# Patient Record
Sex: MALE | Race: WHITE | NOT HISPANIC OR LATINO | URBAN - METROPOLITAN AREA
[De-identification: names, ages, dates, MRNs, and addresses within clinical notes are randomized per-mention and may not be internally consistent; named-entity substitution may affect disease eponyms.]

---

## 2020-03-16 ENCOUNTER — VIRTUAL VISIT (OUTPATIENT)
Dept: FAMILY MEDICINE | Facility: OTHER | Age: 50
End: 2020-03-16

## 2020-03-17 NOTE — PROGRESS NOTES
"Date: 2020 15:48:59  Clinician: Andreea Lindsey  Clinician NPI: [id number]  Patient: Kun Ordaz  Patient : 1970  Patient Address: [address] [place] [id number]  Patient Phone: [phone]  Visit Protocol: URI  Patient Summary:  Kun is a 50 year old ( : 1970 ) male who initiated a Visit for COVID-19 (Coronavirus) evaluation and screening. When asked the question \"Please sign me up to receive news, health information and promotions. \", Kun responded \"Yes\".    Kun states his symptoms started gradually 3-6 days ago. After his symptoms started, they improved and then got worse again.   His symptoms consist of a headache and enlarged lymph nodes.   Symptom details   Headache: He states the headache is mild (1-3 on a 10 point pain scale).    Kun denies having ear pain, malaise, rhinitis, facial pain or pressure, myalgias, wheezing, sore throat, cough, nasal congestion, teeth pain, fever, and chills. He also denies having recent facial or sinus surgery in the past 60 days, taking antibiotic medication for the symptoms, and having a sinus infection within the past year. He is not experiencing dyspnea.    Pertinent COVID-19 (Coronavirus) information  Kun has not traveled internationally or to the areas where COVID-19 (Coronavirus) is widespread in the last 14 days before the start of his symptoms.   Kun has not had close contact with a suspected or laboratory-confirmed COVID-19 patient within 14 days of symptom onset.   Kun is not a healthcare worker and does not work in a healthcare facility.   Pertinent medical history  Kun does not need a return to work/school note.   Weight: 210 lbs   Kun does not smoke or use smokeless tobacco.   Weight: 210 lbs    MEDICATIONS: No current medications, ALLERGIES: NKDA  Clinician Response:  Dear Kun,  Based on the information provided, you have a viral upper respiratory infection, otherwise known as a cold. " Symptoms vary from person to person, but can include sneezing, coughing, a runny nose, sore throat, and headache and range from mild to severe.  Unfortunately, there are no medications that can cure a cold, so treatment is focused on controlling symptoms as much as possible. Most people gradually feel better until symptoms are gone in 1-2 weeks.  Medication information  Because you have a viral infection, antibiotics will not help you get better. Treating a viral infection with antibiotics could actually make you feel worse.  For more information on why I am not prescribing antibiotics, please watch this video: Antibiotics Aren't Always the Answer.  Unless you are allergic to the over-the-counter medication(s) below, I recommend using:       Acetaminophen (Tylenol or store brand) oral tablet. Take 1-2 tablets by mouth every 4-6 hours to help with the discomfort.      Ibuprofen (Advil or store brand) 200 mg oral tablet. Take 1-3 tablets (200-600 mg) by mouth every 8 hours to help with the discomfort. Make sure to take the ibuprofen with food. Do not exceed 2400 mg in 24 hours.     Over-the-counter medications do not require a prescription. Ask the pharmacist if you have any questions.  Self care  The following tips will keep you as comfortable as possible while you recover:     Rest    Drink plenty of water and other liquids     When to seek care  Please be seen in a clinic or urgent care if new symptoms develop, or symptoms become worse.  COVID-19 (Coronavirus) General Information  With the increase in the number of COVID-19 (Coronavirus) cases, we understand you may have some questions. Below is some helpful information on COVID-19 (Coronavirus).  How can I protect myself and others from the COVID-19 (Coronavirus)?  Because there is currently no vaccine to prevent infection, the best way to protect yourself is to avoid being exposed to this virus. Put distance between yourself and other people if COVID-19  (Coronavirus) is spreading in your community. The virus is thought to spread mainly from person-to-person.     Between people who are in close contact with one another (within about 6 about) for prolonged period (10 minutes or longer).    Through respiratory droplets produced when an infected person coughs or sneezes.     The CDC recommends the following additional steps to protect yourself and others:     Wash your hands often with soap and water for at least 20 seconds, especially after blowing your nose, coughing, or sneezing; going to the bathroom; and before eating or preparing food.  Use an alcohol-based hand  that contains at least 60 percent alcohol if soap and water are not available.        Avoid touching your eyes, nose and mouth with unwashed hands.    Avoid close contact with people who are sick.    Stay home when you are sick.    Cover your cough or sneeze with a tissue, then throw the tissue in the trash.    Clean and disinfect frequently touched objects and surfaces.     You can help stop COVID-19 (Coronavirus) by knowing the signs and symptoms:     Fever    Cough    Shortness of breath     Contact your healthcare provider if   Develop symptoms   AND   Have been in close contact with a person known to have COVID-19 (Coronavirus) or live in or have recently traveled from an area with ongoing spread of COVID-19 (Coronavirus). Call ahead before you go to a doctor's office or emergency room. Tell them about your recent travel and your symptoms.   For the most up to date information, visit the CDC's website.  Steps to help prevent the spread of COVID-19 (Coronavirus) if you are sick  If you are sick with COVID-19 (Coronavirus) or suspect you are infected with the virus that causes COVID-19 (Coronavirus), follow the steps below to help prevent the disease from spreading&nbsp;to people in your home and community.     Stay home except to get medical care. Home isolation may be started in  "consultation with your healthcare clinician.    Separate yourself from other people and animals in your home.    Call ahead before visiting your doctor if you have a medical appointment.    Wear a facemask when you are around other people.    Cover your cough and sneezes.    Clean your hands often.    Avoid sharing personal household items.    Clean and disinfect frequently touched objects and surfaces everyday.    You will need to have someone drop off medications or household supplies (if needed) at your house without coming inside or in contact with you or others living in your house.    Monitor your symptoms and seek prompt medical care if your illness is worsening (e.g. Difficulty breathing).    Discontinue home isolation only in consultation with your healthcare provider.     For more detailed and up to date information on what to do if you are sick, visit this link: What to Do If You Are Sick With Coronavirus Disease 2019 (COVID-19).  Do I need to be tested for COVID-19 (Coronavirus)?     At this time, the limited number of tests available are controlled by the state and local health departments and are being reserved for more seriously ill patients, those with known exposure to confirmed patients, and those with recent travel (within 14 days) to countries with high rates of COVID-19 (Coronavirus).    Decisions on which patients receive testing will be based on the local spread of COVID-19 (Coronavirus) as well as the symptoms. Your healthcare provider will make the final decision on whether you should be tested.    In the meantime, if you have concerns that you may have been exposed, it is reasonable to practice \"social distancing.\"&nbsp; If you are ill with a cold or flu-like illness, please monitor your symptoms and reach out to your healthcare provider if your symptoms worsen.    For more up to date information, visit this link: COVID-19 (Coronavirus) Frequently Asked Questions and Answers.    "   Diagnosis: Viral URI  Diagnosis ICD: J06.9

## 2020-08-14 NOTE — PROGRESS NOTES
"  SUBJECTIVE:   Kun is a 50 year old male who presents to clinic today to establish care and for annual wellness exam.    # Depression  - restarted therapy for the first time in a while 1.5 months ago  - likes the new therapist  - has been a lifelong issue  - SI has started to come up, mixed in \"overall dark, depressed thoughts\", denies specific plans  - last suicide attempt in college   - has been on medications in the past, in 90's for about 6 years, tried various antidepressants, lots of weight gain, \"violent suicidal thoughts\"    - drinks an average of 5-7 beers over the course of the week, hard liquor rarely  - former smoker  - occasionally uses oral THC gummy for sleep which helps    - takes diphenhydramine product (like Advil PM) about 3 days a week  - melatonin doesn't help  - CBD oral supplement sometimes helps    PHQ 12/3/2015 2016 2020   PHQ-9 Total Score 11 17 16   Q9: Thoughts of better off dead/self-harm past 2 weeks Several days More than half the days Several days       # Skin tags  - on chest  - not painful or pruritic  - no enlarging    # Health Maintenance  - HIV Screenin/24/15 nonreactive  - Hep C Screenin/24/15 nonreactive  - BP:   BP Readings from Last 3 Encounters:   20 135/83   16 125/78   12/03/15 128/84   - Cholesterol:   Recent Labs   Lab Test 12/03/15  0848   CHOL 292.0*   HDL 62.0   .0*   TRIG 252.0*   - Diabetes Screenin/25/15 5.8%  - Colonoscopy: start at age 50  - (+) seatbelt use, (+) helmet    - Exercise: walks 2.5 miles daily with his dogs but usually just 1/2 mile at a time 25 minutes at a time.   - at least one serving of fruits or vegetables each day, not every meal    - was down to 190lbs last Fall, felt \"great\" at this weight  - regained weight during pandemic    Today's PHQ-2 Score:   PHQ-2 (  [employer]) 2020   Q1: Little interest or pleasure in doing things 2 2   Q2: Feeling down, depressed or hopeless 2 2   PHQ-2 " Score 4 4       Review of Systems:   Constitutional - no fevers, chills, night sweats, unintentional weight loss/gain   Eyes - no vision concerns   Ears/Nose/Throat - no hearing concerns, no dysphagia/odynophagia   Cardiovascular - no chest pain, palpitations   Pulmonary - no shortness of breath, wheezing, coughing   GI - no abdominal pain, constipation, diarrhea, nausea, vomiting    - no dysuria, polyuria, hematuria   Musculoskeletal - no joint or muscle pain  Integument - as above   Neuro - no weakness, numbness, paresthesia   Heme - no easy bruising/bleeding   Endocrine - no polyuria, weight loss/gain, dry skin, excessive sweating, hair loss   Psychiatric -  As above   Allergic/Immunologic - no history of anaphylaxis, no history of recurrent infections    Past Medical History:   Diagnosis Date     Depressive disorder, not elsewhere classified     quit treatment in      OCD (obsessive compulsive disorder)      Suicide attempt (H)      Past Surgical History:   Procedure Laterality Date     NO HISTORY OF SURGERY       Family History   Problem Relation Age of Onset     Hypertension Father      C.A.D. Father         MI age 42     Hyperlipidemia Father      Hyperlipidemia Mother      Cancer Maternal Grandmother         pancreatic     Cancer Maternal Grandfather         ?     Circulatory Paternal Aunt         bilateral bka      Depression Sister      Social History     Tobacco Use     Smoking status: Former Smoker     Last attempt to quit: 2003     Years since quittin.7     Smokeless tobacco: Never Used     Tobacco comment: casual, social smoker.. 1 pack every 6 months.. quit smoking 03   Substance Use Topics     Alcohol use: Yes     Frequency: 4 or more times a week     Drinks per session: 1 or 2     Binge frequency: Less than monthly     Comment: 3 drinks per week     Drug use: No     Social History     Social History Narrative    No illicit IV or intranasal drug use    No blood transfusions     "No tattoos or piercings        Lives with wife and roommate.  No pets.  Works as a  does a lot of travel.  Feels safe in all environments.  Has a good support network.    Елена Jenkins PA-C    12/03/2015       Current Outpatient Medications   Medication     melatonin 1 MG/ML LIQD liquid     No current facility-administered medications for this visit.      I have reviewed the patient's past medical, surgical, family, and social history.     OBJECTIVE:   /83 (BP Location: Right arm, Patient Position: Sitting, Cuff Size: Adult Regular)   Pulse 78   Temp 98.1  F (36.7  C) (Skin)   Resp 15   Ht 1.72 m (5' 7.72\")   Wt 97.9 kg (215 lb 12 oz)   SpO2 96%   BMI 33.08 kg/m      Constitutional: well-appearing, appears stated age  Eyes: conjunctivae without erythema, sclera anicteric. Pupils equal, round, and reactive to light.   ENT: oropharynx clear, TM grey bilateral  Cardiac: regular rate and rhythm, normal S1/S2, no murmur/rubs/gallops  Respiratory: lungs clear to auscultation bilaterally, normal work of breathing, no wheezes/crackles  GI: abdomen soft, non-tender, non-distended  Extremities: warm and well perfused, radial pulses 2+ and equal, cap refill brisk.  Lymph: no cervical or supraclavicular lymphadenopathy  Skin: approximately 1cm tan flat stuck on rough raised plaque left lower back. Multiple subcentimeter red to purple papules scattered across back, one on center of chest, one on left upper arm.   Psych: affect is full and appropriate, speech is fluent and non-pressured    ASSESSMENT AND PLAN:     COUNSELING:   Reviewed preventive health counseling, as reflected in patient instructions       Regular exercise       Healthy diet/nutrition    (Z00.00) Annual physical exam  (primary encounter diagnosis)  Comment: Age appropriate screening and preventive services provided. Up to date on immunizations. BP good.     Life Goals:  Limit red meat (including pork) to once a week on average  Buy a " "pedometer  Think about real exercise meaning taking 15-20 minutes to walk a mile    Plan: Hemoglobin A1c ([place]), Lipid Profile,       (Z91.89) At risk for heart disease  Comment: Due to extensive family history of premature ASCVD, Kun was interested in coronary artery calcification scoring, which I think is reasonable.  Plan: CT Coronary Calcium Scan         (D18.01) Cherry hemangioma  Comment: \"Skin tags\" in question appear consistent with cherry hemangiomas, which he has multiple of, as well as SK's. Did not conduct full skin exam but has multiple pigmented flat macules that appear consistent in appearance across his back, appear to be benign nevi.   Plan:     (F33.1) Major depressive disorder, recurrent episode, moderate (H)  Comment: Has restarted therapy. No active plans of self harm. Offered to discuss medication options for depression which he declined.   Plan:     Hoang Bryan MD  [facility]  08/17/2020, 3:04 PM    "

## 2020-08-17 ENCOUNTER — OFFICE VISIT (OUTPATIENT)
Dept: FAMILY MEDICINE | Facility: CLINIC | Age: 50
End: 2020-08-17
Payer: COMMERCIAL

## 2020-08-17 VITALS
DIASTOLIC BLOOD PRESSURE: 83 MMHG | HEIGHT: 68 IN | TEMPERATURE: 98.1 F | WEIGHT: 215.75 LBS | OXYGEN SATURATION: 96 % | HEART RATE: 78 BPM | RESPIRATION RATE: 15 BRPM | SYSTOLIC BLOOD PRESSURE: 135 MMHG | BODY MASS INDEX: 32.7 KG/M2

## 2020-08-17 DIAGNOSIS — D18.01 CHERRY HEMANGIOMA: ICD-10-CM

## 2020-08-17 DIAGNOSIS — Z00.00 ANNUAL PHYSICAL EXAM: Primary | ICD-10-CM

## 2020-08-17 DIAGNOSIS — Z91.89 AT RISK FOR HEART DISEASE: ICD-10-CM

## 2020-08-17 DIAGNOSIS — F33.1 MAJOR DEPRESSIVE DISORDER, RECURRENT EPISODE, MODERATE (H): Chronic | ICD-10-CM

## 2020-08-17 SDOH — HEALTH STABILITY: MENTAL HEALTH: HOW MANY STANDARD DRINKS CONTAINING ALCOHOL DO YOU HAVE ON A TYPICAL DAY?: 1 OR 2

## 2020-08-17 SDOH — HEALTH STABILITY: MENTAL HEALTH: HOW OFTEN DO YOU HAVE A DRINK CONTAINING ALCOHOL?: 4 OR MORE TIMES A WEEK

## 2020-08-17 SDOH — HEALTH STABILITY: MENTAL HEALTH: HOW OFTEN DO YOU HAVE 6 OR MORE DRINKS ON ONE OCCASION?: NEVER

## 2020-08-17 ASSESSMENT — ANXIETY QUESTIONNAIRES
IF YOU CHECKED OFF ANY PROBLEMS ON THIS QUESTIONNAIRE, HOW DIFFICULT HAVE THESE PROBLEMS MADE IT FOR YOU TO DO YOUR WORK, TAKE CARE OF THINGS AT HOME, OR GET ALONG WITH OTHER PEOPLE: VERY DIFFICULT
3. WORRYING TOO MUCH ABOUT DIFFERENT THINGS: MORE THAN HALF THE DAYS
2. NOT BEING ABLE TO STOP OR CONTROL WORRYING: MORE THAN HALF THE DAYS
1. FEELING NERVOUS, ANXIOUS, OR ON EDGE: MORE THAN HALF THE DAYS
7. FEELING AFRAID AS IF SOMETHING AWFUL MIGHT HAPPEN: NEARLY EVERY DAY
5. BEING SO RESTLESS THAT IT IS HARD TO SIT STILL: NOT AT ALL
GAD7 TOTAL SCORE: 14
6. BECOMING EASILY ANNOYED OR IRRITABLE: NEARLY EVERY DAY

## 2020-08-17 ASSESSMENT — PATIENT HEALTH QUESTIONNAIRE - PHQ9
SUM OF ALL RESPONSES TO PHQ QUESTIONS 1-9: 16
5. POOR APPETITE OR OVEREATING: MORE THAN HALF THE DAYS

## 2020-08-17 ASSESSMENT — MIFFLIN-ST. JEOR: SCORE: 1818.65

## 2020-08-17 NOTE — PATIENT INSTRUCTIONS
Life Goals:  Limit red meat (including pork) to once a week on average  Buy a pedometer  Think about real exercise meaning taking 15-20 minutes to walk a mile    To schedule your CT Coronary artery calcification score, please call:  [facility]  [phone]

## 2020-08-18 DIAGNOSIS — Z00.00 ANNUAL PHYSICAL EXAM: ICD-10-CM

## 2020-08-18 LAB
CHOLEST SERPL-MCNC: 243 MG/DL
HBA1C MFR BLD: 5.4 % (ref 4.1–5.7)
HDLC SERPL-MCNC: 49 MG/DL
LDLC SERPL CALC-MCNC: 162 MG/DL
NONHDLC SERPL-MCNC: 194 MG/DL
TRIGL SERPL-MCNC: 164 MG/DL

## 2020-08-18 ASSESSMENT — ANXIETY QUESTIONNAIRES: GAD7 TOTAL SCORE: 14

## 2020-08-28 ENCOUNTER — HOSPITAL ENCOUNTER (OUTPATIENT)
Dept: CT IMAGING | Facility: CLINIC | Age: 50
Discharge: HOME | End: 2020-08-28
Attending: FAMILY MEDICINE | Admitting: FAMILY MEDICINE
Payer: COMMERCIAL

## 2020-08-28 DIAGNOSIS — Z91.89 AT RISK FOR HEART DISEASE: ICD-10-CM

## 2020-08-28 PROCEDURE — 75571 CT HRT W/O DYE W/CA TEST: CPT

## 2020-08-28 PROCEDURE — 75571 CT HRT W/O DYE W/CA TEST: CPT | Mod: 26 | Performed by: STUDENT IN AN ORGANIZED HEALTH CARE EDUCATION/TRAINING PROGRAM

## 2020-08-31 DIAGNOSIS — Z91.89 AT RISK FOR HEART DISEASE: Primary | ICD-10-CM

## 2020-08-31 RX ORDER — SIMVASTATIN 40 MG
40 TABLET ORAL AT BEDTIME
Qty: 90 TABLET | Refills: 3 | Status: SHIPPED | OUTPATIENT
Start: 2020-08-31 | End: 2022-04-08

## 2020-09-19 ENCOUNTER — VIRTUAL VISIT (OUTPATIENT)
Dept: FAMILY MEDICINE | Facility: OTHER | Age: 50
End: 2020-09-19

## 2020-09-19 NOTE — PROGRESS NOTES
"Date: 2020 15:00:21  Clinician: Rebecca Cheung  Clinician NPI: [id number]  Patient: Kun Ordaz  Patient : 1970  Patient Address: [address] [place] [address]  Patient Phone: [phone]  Visit Protocol: URI  Patient Summary:  Kun is a 50 year old ( : 1970 ) male who initiated a [facility] Visit for COVID-19 (Coronavirus) evaluation and screening. When asked the question \"Please sign me up to receive news, health information and promotions. \", Kun responded \"No\".    Kun states his symptoms started gradually 3-4 days ago.   His symptoms consist of malaise, facial pain or pressure, nasal congestion, a headache, and myalgia.   Symptom details     Nasal secretions: The color of his mucus is clear.    Facial pain or pressure: The facial pain or pressure feels worse when bending over or leaning forward.     Headache: He states the headache is mild (1-3 on a 10 point pain scale).      Kun denies having chills, fever, sore throat, teeth pain, ageusia, diarrhea, cough, ear pain, anosmia, vomiting, rhinitis, nausea, and wheezing. He also denies double sickening (worsening symptoms after initial improvement), taking antibiotic medication in the past month, and having recent facial or sinus surgery in the past 60 days. He is not experiencing dyspnea.   Precipitating events  He has not recently been exposed to someone with influenza. Kun has not been in close contact with any high risk individuals.   Pertinent COVID-19 (Coronavirus) information  In the past 14 days, Kun has not worked in a congregate living setting.   He does not work or volunteer as healthcare worker or a  and does not work or volunteer in a healthcare facility.   Kun also has not lived in a congregate living setting in the past 14 days. He does not live with a healthcare worker.   Kun has not had a close contact with a laboratory-confirmed COVID-19 patient within 14 days of " symptom onset.   Since December 2019, Kun and has not had upper respiratory infection or influenza-like illness. Has not been diagnosed with lab-confirmed COVID-19 test   Pertinent medical history  Kun does not need a return to work/school note.   Weight: 215 lbs   Kun does not smoke or use smokeless tobacco.   Weight: 215 lbs    MEDICATIONS: No current medications, ALLERGIES: NKDA  Clinician Response:  Dear Kun,   Your symptoms show that you may have coronavirus (COVID-19). This illness can cause fever, cough and trouble breathing. Many people get a mild case and get better on their own. Some people can get very sick.  What should I do?  We would like to test you for this virus.   1. Please call [phone] to schedule your visit. Explain that you were referred by [facility] to have a COVID-19 test. Be ready to share your [facility] visit ID number.  The following will serve as your written order for this COVID Test, ordered by me, for the indication of suspected COVID [Z20.828]: The test will be ordered in [employer], our electronic health record, after you are scheduled. It will show as ordered and authorized by Sebas Dominguez MD.  Order: COVID-19 (Coronavirus) PCR for SYMPTOMATIC testing from [facility].      2. When it's time for your COVID test:  Stay at least 6 feet away from others. (If someone will drive you to your test, stay in the backseat, as far away from the  as you can.)   Cover your mouth and nose with a mask, tissue or washcloth.  Go straight to the testing site. Don't make any stops on the way there or back.      3.Starting now: Stay home and away from others (self-isolate) until:   You've had no fever---and no medicine that reduces fever---for one full day (24 hours). And...   Your other symptoms have gotten better. For example, your cough or breathing has improved. And...   At least 10 days have passed since your symptoms started.       During this time, don't leave the house except for  "testing or medical care.   Stay in your own room, even for meals. Use your own bathroom if you can.   Stay away from others in your home. No hugging, kissing or shaking hands. No visitors.  Don't go to work, school or anywhere else.    Clean \"high touch\" surfaces often (doorknobs, counters, handles, etc.). Use a household cleaning spray or wipes. You'll find a full list of  on the EPA website: www.epa.gov/pesticide-registration/list-n-disinfectants-use-against-sars-cov-2.   Cover your mouth and nose with a mask, tissue or washcloth to avoid spreading germs.  Wash your hands and face often. Use soap and water.  Caregivers in these groups are at risk for severe illness due to COVID-19:  o People 65 years and older  o People who live in a nursing home or long-term care facility  o People with chronic disease (lung, heart, cancer, diabetes, kidney, liver, immunologic)  o People who have a weakened immune system, including those who:   Are in cancer treatment  Take medicine that weakens the immune system, such as corticosteroids  Had a bone marrow or organ transplant  Have an immune deficiency  Have poorly controlled HIV or AIDS  Are obese (body mass index of 40 or higher)  Smoke regularly   o Caregivers should wear gloves while washing dishes, handling laundry and cleaning bedrooms and bathrooms.  o Use caution when washing and drying laundry: Don't shake dirty laundry, and use the warmest water setting that you can.  o For more tips, go to www.cdc.gov/coronavirus/2019-ncov/downloads/10Things.pdf.    4.Sign up for [employer]. We know it's scary to hear that you might have COVID-19. We want to track your symptoms to make sure you're okay over the next 2 weeks. Please look for an email from [employer]---this is a free, online program that we'll use to keep in touch. To sign up, follow the link in the email. Learn more at [email or web address]  How can I take care of myself?   Get lots of rest. " Drink extra fluids (unless a doctor has told you not to).   Take Tylenol (acetaminophen) for fever or pain. If you have liver or kidney problems, ask your family doctor if it's okay to take Tylenol.   Adults can take either:    650 mg (two 325 mg pills) every 4 to 6 hours, or...   1,000 mg (two 500 mg pills) every 8 hours as needed.    Note: Don't take more than 3,000 mg in one day. Acetaminophen is found in many medicines (both prescribed and over-the-counter medicines). Read all labels to be sure you don't take too much.   For children, check the Tylenol bottle for the right dose. The dose is based on the child's age or weight.    If you have other health problems (like cancer, heart failure, an organ transplant or severe kidney disease): Call your specialty clinic if you don't feel better in the next 2 days.       Know when to call 911. Emergency warning signs include:    Trouble breathing or shortness of breath Pain or pressure in the chest that doesn't go away Feeling confused like you haven't felt before, or not being able to wake up Bluish-colored lips or face.  Where can I get more information?   [facility] -- About COVID-19: [email or web address]   CDC -- What to Do If You're Sick: www.cdc.gov/coronavirus/2019-ncov/about/steps-when-sick.html   CDC -- Ending Home Isolation: www.cdc.gov/coronavirus/2019-ncov/hcp/disposition-in-home-patients.html   CDC -- Caring for Someone: www.cdc.gov/coronavirus/2019-ncov/if-you-are-sick/care-for-someone.html   [facility] -- Interim Guidance for Hospital Discharge to Home: www.health.[facility].mn.us/diseases/coronavirus/hcp/hospdischarge.pdf   [facility] clinical trials (COVID-19 research studies): [email or web address]    Below are the COVID-19 hotlines at the [place] [facility] ([facility]). Interpreters are available.    For health questions: Call 165-639-7110 or 1-256.386.8157 (7 a.m. to 7 p.m.) For questions about schools  and childcare: Call 983-049-1813 or 1-462.245.6936 (7 a.m. to 7 p.m.)    Diagnosis: Myalgia, unspecified site  Diagnosis ICD: M79.10

## 2021-10-22 ENCOUNTER — OFFICE VISIT (OUTPATIENT)
Dept: FAMILY MEDICINE | Facility: CLINIC | Age: 51
End: 2021-10-22
Payer: COMMERCIAL

## 2021-10-22 VITALS
OXYGEN SATURATION: 98 % | TEMPERATURE: 97.2 F | RESPIRATION RATE: 13 BRPM | DIASTOLIC BLOOD PRESSURE: 89 MMHG | HEART RATE: 74 BPM | BODY MASS INDEX: 35.38 KG/M2 | SYSTOLIC BLOOD PRESSURE: 130 MMHG | WEIGHT: 230.75 LBS

## 2021-10-22 DIAGNOSIS — N50.812 PAIN IN LEFT TESTICLE: Primary | ICD-10-CM

## 2021-10-22 DIAGNOSIS — F33.2 SEVERE EPISODE OF RECURRENT MAJOR DEPRESSIVE DISORDER, WITHOUT PSYCHOTIC FEATURES (H): Chronic | ICD-10-CM

## 2021-10-22 DIAGNOSIS — Z23 NEED FOR VACCINATION: ICD-10-CM

## 2021-10-22 LAB
ALBUMIN UR-MCNC: NEGATIVE MG/DL
APPEARANCE UR: CLEAR
BILIRUB UR QL STRIP: NEGATIVE
COLOR UR AUTO: ABNORMAL
GLUCOSE UR STRIP-MCNC: NEGATIVE MG/DL
HGB UR QL STRIP: NEGATIVE
KETONES UR STRIP-MCNC: NEGATIVE MG/DL
LEUKOCYTE ESTERASE UR QL STRIP: NEGATIVE
MUCOUS THREADS #/AREA URNS LPF: PRESENT /LPF
NITRATE UR QL: NEGATIVE
PH UR STRIP: 5.5 [PH] (ref 5–7)
RBC URINE: <1 /HPF
SP GR UR STRIP: 1.02 (ref 1–1.03)
UROBILINOGEN UR STRIP-MCNC: NORMAL MG/DL
WBC URINE: 1 /HPF

## 2021-10-22 PROCEDURE — 81001 URINALYSIS AUTO W/SCOPE: CPT | Performed by: FAMILY MEDICINE

## 2021-10-22 ASSESSMENT — PATIENT HEALTH QUESTIONNAIRE - PHQ9
SUM OF ALL RESPONSES TO PHQ QUESTIONS 1-9: 25
5. POOR APPETITE OR OVEREATING: NEARLY EVERY DAY

## 2021-10-22 ASSESSMENT — ANXIETY QUESTIONNAIRES
6. BECOMING EASILY ANNOYED OR IRRITABLE: NEARLY EVERY DAY
2. NOT BEING ABLE TO STOP OR CONTROL WORRYING: NEARLY EVERY DAY
GAD7 TOTAL SCORE: 21
5. BEING SO RESTLESS THAT IT IS HARD TO SIT STILL: NEARLY EVERY DAY
1. FEELING NERVOUS, ANXIOUS, OR ON EDGE: NEARLY EVERY DAY
3. WORRYING TOO MUCH ABOUT DIFFERENT THINGS: NEARLY EVERY DAY
IF YOU CHECKED OFF ANY PROBLEMS ON THIS QUESTIONNAIRE, HOW DIFFICULT HAVE THESE PROBLEMS MADE IT FOR YOU TO DO YOUR WORK, TAKE CARE OF THINGS AT HOME, OR GET ALONG WITH OTHER PEOPLE: VERY DIFFICULT
7. FEELING AFRAID AS IF SOMETHING AWFUL MIGHT HAPPEN: NEARLY EVERY DAY

## 2021-10-22 NOTE — NURSING NOTE
51 year old  Chief Complaint   Patient presents with     Testicular/scrotal Pain     left side x several weeks     Skin Check     by right temple feels dry and is getting bigger       Blood pressure (!) 147/98, pulse 76, temperature 97.2  F (36.2  C), temperature source Skin, resp. rate 13, weight 104.7 kg (230 lb 12 oz), SpO2 98 %. Body mass index is 35.38 kg/m .  Patient Active Problem List   Diagnosis     Insomnia     Family history of ischemic heart disease     Major depressive disorder, recurrent episode, moderate (H)       Wt Readings from Last 2 Encounters:   10/22/21 104.7 kg (230 lb 12 oz)   20 97.9 kg (215 lb 12 oz)     BP Readings from Last 3 Encounters:   10/22/21 (!) 147/98   20 135/83   16 125/78         Current Outpatient Medications   Medication     simvastatin (ZOCOR) 40 MG tablet     No current facility-administered medications for this visit.       Social History     Tobacco Use     Smoking status: Former Smoker     Quit date: 2003     Years since quittin.9     Smokeless tobacco: Never Used   Substance Use Topics     Alcohol use: Yes     Comment: 5-7 beers over the course of the week     Drug use: No     Comment: rare oral THC       Health Maintenance Due   Topic Date Due     ADVANCE CARE PLANNING  Never done     DEPRESSION ACTION PLAN  Never done     PREVENTIVE CARE VISIT  2021     INFLUENZA VACCINE (1) 2021       No results found for: PAP      2021 2:41 PM

## 2021-10-22 NOTE — NURSING NOTE
"Injectable Influenza Immunization Documentation    1.  Has the patient received the information for the injectable influenza vaccine? YES     2. Is the patient 6 months of age or older? YES     3. Does the patient have any of the following contraindications?         Severe allergy to eggs? No     Severe allergic reaction to previous influenza vaccines? No   Severe allergy to latex? No       History of Guillain-McRoberts syndrome? No     Currently have a temperature greater than 100.4F? No        4.  Severely egg allergic patients should have flu vaccine eligibility assessed by an MD, RN, or pharmacist, and those who received flu vaccine should be observed for 15 min by an MD, RN, Pharmacist, Medical Technician, or member of clinic staff.\": YES    5. Latex-allergic patients should be given latex-free influenza vaccine Yes. Please reference the Vaccine latex table to determine if your clinic s product is latex-containing.       Vaccination given by Lashawn Corona CMA          "
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
## 2021-10-22 NOTE — PATIENT INSTRUCTIONS
"Talk to your therapist next week about an Intensive Outpatient Program   If you are interested in this, ask her to fax us ([phone]) her most recent note and your most recent \"diagnostic assessment\"    "

## 2021-10-23 ASSESSMENT — ANXIETY QUESTIONNAIRES: GAD7 TOTAL SCORE: 21

## 2021-10-27 NOTE — PROGRESS NOTES
ASSESSMENT AND PLAN:     (F33.1) Major depressive disorder, recurrent episode, moderate (H)  (primary encounter diagnosis)  (F42.9) Obsessive-compulsive disorder, unspecified type  Comment: Discussed benefits and potential side effects of restarting on an ssri to improve depression and OCD symptoms. Kun is open to restarting. Will start with low-dose Zoloft 25mg daily and increase to 50mg daily after the first 10 days if tolerating.   Follow up on 11/22/21 with virtual visit to check in.  Plan: sertraline (ZOLOFT) 25 MG tablet    (D22.9) Benign nevus  Comment: Reassured John that the mole in his scalp appears to be benign (present for many years, even coloring, hair growth through it). Monitor for changes.   Plan:     Hoang Bryan MD   [facility]  10/29/2021, 2:12 PM      SUBJECTIVE:   Kun is a 51 year old male who presents to clinic today for a return visit.    # Severe current MDD  - last visit 10/22/21  - met with his therapist on Monday  - brought up the issues with trying to do IOP while [place] is out of state  - they also talked about OCD-specific therapy, support groups  - going to start doing more targeted therapy with his current therapist, has follow up next week    - sleep is all over the map  - sometimes sleeps well, other times can't get to sleep - racing thoughts  - sometimes taking unisom to help    - not currently sexually active with wife    Previous Regimens  Prozac - weight gain, hypersomnia, sexual side effects  Paxil and Wellbutrin - had a lot SI  Celexa  Effexor     # Skin Lesion  - right temple in scalp  - has been enlarging  - present for years    Patient Active Problem List   Diagnosis     Insomnia     Family history of ischemic heart disease     Severe episode of recurrent major depressive disorder, without psychotic features (H)     Obsessive-compulsive disorder, unspecified type     Current Outpatient Medications   Medication     sertraline (ZOLOFT) 25 MG  tablet     simvastatin (ZOCOR) 40 MG tablet     No current facility-administered medications for this visit.       I have reviewed the patient's relevant past medical history.     OBJECTIVE:   /80 (BP Location: Right arm, Patient Position: Sitting, Cuff Size: Adult Large)   Pulse 102   Temp 96.9  F (36.1  C) (Skin)   Resp 13   Wt 106.9 kg (235 lb 12 oz)   SpO2 96%   BMI 36.15 kg/m      Constitutional: well-appearing, appears stated age  Eyes: conjunctivae without erythema, sclera anicteric.   Skin: right temple tan papule, well demarcated, even coloring, with hairs growing through it

## 2021-10-28 ENCOUNTER — OFFICE VISIT (OUTPATIENT)
Dept: FAMILY MEDICINE | Facility: CLINIC | Age: 51
End: 2021-10-28
Payer: COMMERCIAL

## 2021-10-28 ENCOUNTER — ANCILLARY PROCEDURE (OUTPATIENT)
Dept: ULTRASOUND IMAGING | Facility: CLINIC | Age: 51
End: 2021-10-28
Attending: FAMILY MEDICINE
Payer: COMMERCIAL

## 2021-10-28 VITALS
TEMPERATURE: 96.9 F | HEART RATE: 102 BPM | SYSTOLIC BLOOD PRESSURE: 114 MMHG | OXYGEN SATURATION: 96 % | WEIGHT: 235.75 LBS | BODY MASS INDEX: 36.15 KG/M2 | RESPIRATION RATE: 13 BRPM | DIASTOLIC BLOOD PRESSURE: 80 MMHG

## 2021-10-28 DIAGNOSIS — F42.9 OBSESSIVE-COMPULSIVE DISORDER, UNSPECIFIED TYPE: ICD-10-CM

## 2021-10-28 DIAGNOSIS — N50.812 PAIN IN LEFT TESTICLE: ICD-10-CM

## 2021-10-28 DIAGNOSIS — D22.9 BENIGN NEVUS: ICD-10-CM

## 2021-10-28 DIAGNOSIS — F33.1 MAJOR DEPRESSIVE DISORDER, RECURRENT EPISODE, MODERATE (H): Primary | ICD-10-CM

## 2021-10-28 PROCEDURE — 93976 VASCULAR STUDY: CPT | Performed by: RADIOLOGY

## 2021-10-28 PROCEDURE — 76870 US EXAM SCROTUM: CPT | Performed by: RADIOLOGY

## 2021-10-28 RX ORDER — SERTRALINE HYDROCHLORIDE 25 MG/1
TABLET, FILM COATED ORAL
Qty: 70 TABLET | Refills: 1 | Status: SHIPPED | OUTPATIENT
Start: 2021-10-28 | End: 2021-12-06

## 2021-10-28 NOTE — NURSING NOTE
51 year old  Chief Complaint   Patient presents with     Anxiety     discuss medication       Blood pressure 114/80, pulse 102, temperature 96.9  F (36.1  C), temperature source Skin, resp. rate 13, weight 106.9 kg (235 lb 12 oz), SpO2 96 %. Body mass index is 36.15 kg/m .  Patient Active Problem List   Diagnosis     Insomnia     Family history of ischemic heart disease     Severe episode of recurrent major depressive disorder, without psychotic features (H)       Wt Readings from Last 2 Encounters:   10/28/21 106.9 kg (235 lb 12 oz)   10/22/21 104.7 kg (230 lb 12 oz)     BP Readings from Last 3 Encounters:   10/28/21 114/80   10/22/21 130/89   20 135/83         Current Outpatient Medications   Medication     simvastatin (ZOCOR) 40 MG tablet     No current facility-administered medications for this visit.       Social History     Tobacco Use     Smoking status: Former Smoker     Quit date: 2003     Years since quittin.9     Smokeless tobacco: Never Used   Substance Use Topics     Alcohol use: Yes     Comment: 5-7 beers over the course of the week     Drug use: No     Comment: rare oral THC       Health Maintenance Due   Topic Date Due     ADVANCE CARE PLANNING  Never done     DEPRESSION ACTION PLAN  Never done     PREVENTIVE CARE VISIT  2021       No results found for: PAP      2021 11:25 AM

## 2021-10-29 PROBLEM — F42.9 OBSESSIVE-COMPULSIVE DISORDER, UNSPECIFIED TYPE: Status: ACTIVE | Noted: 2021-10-29

## 2021-10-29 PROBLEM — F42.9 OBSESSIVE-COMPULSIVE DISORDER, UNSPECIFIED TYPE: Chronic | Status: ACTIVE | Noted: 2021-10-29
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
## 2021-12-06 ENCOUNTER — VIRTUAL VISIT (OUTPATIENT)
Dept: FAMILY MEDICINE | Facility: CLINIC | Age: 51
End: 2021-12-06
Payer: COMMERCIAL

## 2021-12-06 VITALS — BODY MASS INDEX: 35.61 KG/M2 | HEIGHT: 68 IN | WEIGHT: 235 LBS

## 2021-12-06 DIAGNOSIS — F33.2 SEVERE EPISODE OF RECURRENT MAJOR DEPRESSIVE DISORDER, WITHOUT PSYCHOTIC FEATURES (H): Primary | Chronic | ICD-10-CM

## 2021-12-06 DIAGNOSIS — F42.9 OBSESSIVE-COMPULSIVE DISORDER, UNSPECIFIED TYPE: Chronic | ICD-10-CM

## 2021-12-06 ASSESSMENT — ANXIETY QUESTIONNAIRES
7. FEELING AFRAID AS IF SOMETHING AWFUL MIGHT HAPPEN: MORE THAN HALF THE DAYS
5. BEING SO RESTLESS THAT IT IS HARD TO SIT STILL: NOT AT ALL
GAD7 TOTAL SCORE: 12
1. FEELING NERVOUS, ANXIOUS, OR ON EDGE: MORE THAN HALF THE DAYS
2. NOT BEING ABLE TO STOP OR CONTROL WORRYING: MORE THAN HALF THE DAYS
3. WORRYING TOO MUCH ABOUT DIFFERENT THINGS: MORE THAN HALF THE DAYS
6. BECOMING EASILY ANNOYED OR IRRITABLE: NEARLY EVERY DAY
IF YOU CHECKED OFF ANY PROBLEMS ON THIS QUESTIONNAIRE, HOW DIFFICULT HAVE THESE PROBLEMS MADE IT FOR YOU TO DO YOUR WORK, TAKE CARE OF THINGS AT HOME, OR GET ALONG WITH OTHER PEOPLE: SOMEWHAT DIFFICULT

## 2021-12-06 ASSESSMENT — MIFFLIN-ST. JEOR: SCORE: 1900.96

## 2021-12-06 ASSESSMENT — PATIENT HEALTH QUESTIONNAIRE - PHQ9: 5. POOR APPETITE OR OVEREATING: SEVERAL DAYS

## 2021-12-07 ASSESSMENT — PATIENT HEALTH QUESTIONNAIRE - PHQ9: SUM OF ALL RESPONSES TO PHQ QUESTIONS 1-9: 13

## 2021-12-07 ASSESSMENT — ANXIETY QUESTIONNAIRES: GAD7 TOTAL SCORE: 12

## 2022-04-08 DIAGNOSIS — Z91.89 AT RISK FOR HEART DISEASE: Primary | ICD-10-CM

## 2022-04-08 RX ORDER — SIMVASTATIN 40 MG
40 TABLET ORAL AT BEDTIME
Qty: 30 TABLET | Refills: 0 | Status: SHIPPED | OUTPATIENT
Start: 2022-04-08 | End: 2022-12-01

## 2022-04-08 NOTE — TELEPHONE ENCOUNTER
Simvastatin (Zocor    Last Office Visit: 12/6/21  Future [facility] Appointments: None  Medication last refilled: 8/31/20 #90 with 3 refill(s)    Required labs per protocol:    LAB REF RANGE 12/9/15 8/18/20   Creatinine 0.8-1.25 mg/dL 1.2 --   LDL < 100 mg/dL 180 High   162 High       Medication is being filled for 1 time refill only due to:  Patient needs labs Lipid profile and BMP.    Message sent to scheduling to contact patient to schedule a lab appointment.    Nilda Del Rosario, NICHOLASN, RN, CCM

## 2022-04-15 ENCOUNTER — LAB (OUTPATIENT)
Dept: LAB | Facility: CLINIC | Age: 52
End: 2022-04-15

## 2022-04-15 DIAGNOSIS — Z91.89 AT RISK FOR HEART DISEASE: ICD-10-CM

## 2022-04-15 LAB
ANION GAP SERPL CALCULATED.3IONS-SCNC: 5 MMOL/L (ref 3–14)
BUN SERPL-MCNC: 11 MG/DL (ref 7–30)
CALCIUM SERPL-MCNC: 9 MG/DL (ref 8.5–10.1)
CHLORIDE BLD-SCNC: 109 MMOL/L (ref 94–109)
CHOLEST SERPL-MCNC: 157 MG/DL
CO2 SERPL-SCNC: 26 MMOL/L (ref 20–32)
CREAT SERPL-MCNC: 1.18 MG/DL (ref 0.66–1.25)
FASTING STATUS PATIENT QL REPORTED: YES
GFR SERPL CREATININE-BSD FRML MDRD: 75 ML/MIN/1.73M2
GLUCOSE BLD-MCNC: 98 MG/DL (ref 70–99)
HDLC SERPL-MCNC: 40 MG/DL
LDLC SERPL CALC-MCNC: 86 MG/DL
NONHDLC SERPL-MCNC: 117 MG/DL
POTASSIUM BLD-SCNC: 4.1 MMOL/L (ref 3.4–5.3)
SODIUM SERPL-SCNC: 140 MMOL/L (ref 133–144)
TRIGL SERPL-MCNC: 154 MG/DL

## 2022-04-15 PROCEDURE — 80061 LIPID PANEL: CPT

## 2022-04-15 PROCEDURE — 80048 BASIC METABOLIC PNL TOTAL CA: CPT

## 2022-07-21 NOTE — NURSING NOTE
"50 year old  Chief Complaint   Patient presents with     Physical     and health review, notes fx of heart disease        Blood pressure 135/83, pulse 78, temperature 98.1  F (36.7  C), temperature source Skin, resp. rate 15, height 1.72 m (5' 7.72\"), weight 97.9 kg (215 lb 12 oz), SpO2 96 %. Body mass index is 33.08 kg/m .  Patient Active Problem List   Diagnosis     Anxiety state     Insomnia     Personal history of tobacco use, presenting hazards to health     Mononucleosis     Hepatitis     Hyperlipidemia LDL goal <100     Family history of ischemic heart disease     Major depressive disorder, recurrent episode, moderate (H)     Preventative health care       Wt Readings from Last 2 Encounters:   20 97.9 kg (215 lb 12 oz)   16 100.2 kg (220 lb 13.4 oz)     BP Readings from Last 3 Encounters:   20 135/83   16 125/78   12/03/15 128/84         Current Outpatient Medications   Medication     melatonin 1 MG/ML LIQD liquid     No current facility-administered medications for this visit.        Social History     Tobacco Use     Smoking status: Former Smoker     Last attempt to quit: 2003     Years since quittin.7     Smokeless tobacco: Never Used     Tobacco comment: casual, social smoker.. 1 pack every 6 months.. quit smoking 03   Substance Use Topics     Alcohol use: Yes     Frequency: 4 or more times a week     Drinks per session: 1 or 2     Binge frequency: Less than monthly     Comment: 3 drinks per week     Drug use: No       Health Maintenance Due   Topic Date Due     DEPRESSION ACTION PLAN  1972     PREVENTIVE CARE VISIT  2016     PHQ-9  2017       No results found for: PAP      2020 2:44 PM    " Patient understands condition, prognosis and need for follow up care.

## 2022-08-14 ENCOUNTER — HEALTH MAINTENANCE LETTER (OUTPATIENT)
Age: 52
End: 2022-08-14

## 2022-09-23 ENCOUNTER — NURSE TRIAGE (OUTPATIENT)
Dept: FAMILY MEDICINE | Facility: CLINIC | Age: 52
End: 2022-09-23

## 2022-09-23 ENCOUNTER — VIRTUAL VISIT (OUTPATIENT)
Dept: FAMILY MEDICINE | Facility: CLINIC | Age: 52
End: 2022-09-23

## 2022-09-23 DIAGNOSIS — U07.1 INFECTION DUE TO 2019 NOVEL CORONAVIRUS: Primary | ICD-10-CM

## 2022-09-23 ASSESSMENT — PATIENT HEALTH QUESTIONNAIRE - PHQ9: SUM OF ALL RESPONSES TO PHQ QUESTIONS 1-9: 1

## 2022-09-23 NOTE — TELEPHONE ENCOUNTER
*Put Sx in reason for call  Symptom: Patient noticed first symptom Wednesday (9/21). Patient tested positive for COVID 9/22. Current symptoms, headache, raspy throat, body ache, sinus infection, chills, and fever.  Additional Comments: Patient would like to speak with RNs about treatment plan.

## 2022-09-23 NOTE — TELEPHONE ENCOUNTER
COVID Medication Reconciliation  ~Simvastatin - hold x 8 days    Pharmacy: [facility] [address]    Reason for Disposition    [1] COVID-19 diagnosed by positive lab test (e.g., PCR, rapid self-test kit) AND [2] mild symptoms (e.g., cough, fever, others) AND [3] no complications or SOB    Answer Assessment - Initial Assessment Questions  1. COVID-19 DIAGNOSIS: Positive COVID home antigen test 9/22/22  2. COVID-19 EXPOSURE: Recent travel to [place]  3. ONSET: 9/2122  4. WORST SYMPTOM: Headache  5. COUGH: Intermittent dry cough  6. FEVER: Believes so, but does not own a thermometer  7. RESPIRATORY STATUS: Normal breathing  8. BETTER-SAME-WORSE: Worse - sinus pressure  9. HIGH RISK DISEASE: BMI > 36  10. VACCINE: Pfizer - 03/12/21, 4/19/21  11. BOOSTER: Pfizer - 11/8/21, 4/15/22  12. PREGNANCY: N/A  13. OTHER SYMPTOMS: Raspy throat, body aches, fatigue, sinus pressure and congestion, chills,   14. O2 SATURATION MONITOR:  No    Protocols used: CORONAVIRUS (COVID-19) DIAGNOSED OR [id number]    Kun is scheduled for a virtual COVID treatment visit with Dr. Benito today, 9/23/22 @ 1 pm.  LAMAR Navarrete, RN, [facility]  09/23/22  10:44 AM

## 2022-09-23 NOTE — PROGRESS NOTES
Kun is a 52 year old who is being evaluated via a billable video visit.        Subjective   Kun is a 52 year old, presenting via a VIDEO visit for the following health issues:  Covid Treatment (Tested Positive 9/22/2022 with a home test. )  He has had 4 vaccinations.     HPI       COVID-19 Symptom Review  How many days ago did these symptoms start? 3 days ago.   Tested negative 2 days ago, then positive yesterday.   Symptoms severe yesterday with headache, body ache and felt chilled. Had sweats   Today, improving. He called this morning, but The last 3 hours things are starting to stabilize, and he's not so sure about the need for Paxlovid    Are any of the following symptoms significant for you?    New or worsening difficulty breathing? No    Worsening cough? No    Fever or chills? Yes, I felt feverish or had chills, but that has subsided    Headache: YES    Sore throat: No    Chest pain: No    Diarrhea: YES- once yesterday    Body aches? YES    What treatments has patient tried? Nonsteroidals and Decongestant - nasal   Does patient live in a nursing home, group home, or shelter? No  Does patient have a way to get food/medications during quarantined? Yes, I have a friend or family member who can help me.        Objective           Vitals:  No vitals were obtained today due to virtual visit.    Physical Exam   GENERAL: Healthy, alert and no distress  EYES: Eyes grossly normal to inspection.  RESP: No audible wheeze, cough, or visible cyanosis.  No visible retractions or increased work of breathing.    SKIN: Visible skin clear. No significant rash, abnormal pigmentation or lesions.  NEURO: Cranial nerves grossly intact.  Mentation and speech appropriate.  PSYCH: Mentation appears normal, affect normal/bright, judgement and insight intact, normal speech       Assessment: COVID-19 infection now about 3 days in duration with symptoms starting to improve.    Plan: We discussed treatment such as with the antiviral  medication, Paxlovid.  Given that he is improving and showing no signs of respiratory distress, elected to not treat with the antiviral medication.  Kun was in agreement with this plan.  Encouraged him to increase his fluid intake, open windows in his home, use Naprosyn if needed for body aches and cough, twice daily.  Also, have a repeat test in a few days from now.            Video-Visit Details    Video Start Time: 1:04    Type of service:  Video Visit    Video End Time:1:27 PM    Originating Location (pt. Location): Home    Distant Location (provider location):  [facility]     Platform used for Video Visit: [employer]

## 2022-09-23 NOTE — NURSING NOTE
52 year old  Chief Complaint   Patient presents with     Covid Treatment     Tested Positive 2022 with a home test.        There were no vitals taken for this visit. There is no height or weight on file to calculate BMI.  Patient Active Problem List   Diagnosis     Insomnia     Family history of ischemic heart disease     Severe episode of recurrent major depressive disorder, without psychotic features (H)     Obsessive-compulsive disorder, unspecified type       Wt Readings from Last 2 Encounters:   21 106.6 kg (235 lb)   10/28/21 106.9 kg (235 lb 12 oz)     BP Readings from Last 3 Encounters:   10/28/21 114/80   10/22/21 130/89   20 135/83         Current Outpatient Medications   Medication     simvastatin (ZOCOR) 40 MG tablet     No current facility-administered medications for this visit.       Social History     Tobacco Use     Smoking status: Former Smoker     Quit date: 2003     Years since quittin.8     Smokeless tobacco: Never Used   Substance Use Topics     Alcohol use: Yes     Comment: 5-7 beers over the course of the week     Drug use: No     Comment: rare oral THC       Health Maintenance Due   Topic Date Due     ADVANCE CARE PLANNING  Never done     DEPRESSION ACTION PLAN  Never done     COLORECTAL CANCER SCREENING  Never done     PREVENTIVE CARE VISIT  2021     COVID-19 Vaccine (5 - Booster for Pfizer series) 06/10/2022     INFLUENZA VACCINE (1) 2022     ZOSTER IMMUNIZATION (1 of 2) 02/10/2022       No results found for: PAP      2022 1:01 PM

## 2022-09-29 ENCOUNTER — TELEPHONE (OUTPATIENT)
Dept: FAMILY MEDICINE | Facility: CLINIC | Age: 52
End: 2022-09-29

## 2022-09-29 NOTE — CONFIDENTIAL NOTE
Called and spoke with John who is still testing positive today on day 8 and it is his anniversary this weekend and he and his wife had plans to go to a concert and dinner.  He is struggling with what to do as his fatigue is severe and he doesn't think he'll be able to go just based on that along, much less that he's still testing positive.  I did tell him he should not come out of quarantine until he has a negative test and to perhaps see if his wife could find a friend to go to the concert with her and face time the concert for him and bring carryout home for him.  He thought that was very creative and said he'll see what he can do.  NICHOLAS NavarreteN, RN, [facility]  09/29/22  11:18 AM

## 2022-11-19 ENCOUNTER — HEALTH MAINTENANCE LETTER (OUTPATIENT)
Age: 52
End: 2022-11-19

## 2022-12-01 ENCOUNTER — MYC REFILL (OUTPATIENT)
Dept: FAMILY MEDICINE | Facility: CLINIC | Age: 52
End: 2022-12-01

## 2022-12-01 DIAGNOSIS — Z91.89 AT RISK FOR HEART DISEASE: ICD-10-CM

## 2022-12-01 RX ORDER — SIMVASTATIN 40 MG
40 TABLET ORAL AT BEDTIME
Qty: 30 TABLET | Refills: 0 | Status: SHIPPED | OUTPATIENT
Start: 2022-12-01 | End: 2022-12-30

## 2022-12-01 NOTE — TELEPHONE ENCOUNTER
Medication requested: simvastatin (ZOCOR) 40 MG tablet  Last office visit: 12/6/2021  [facility] appointments: none  Medication last refilled: 4/8/2022; 30 + 0 refills  Last qualifying labs:     Component      Latest Ref Rng & Units 4/15/2022   LDL Cholesterol Calculated      <=100 mg/dL 86       Routing refill request to provider for review/approval because:  A break in medication    LAMAR Cooley, RN  12/01/22, 4:42 PM

## 2022-12-30 DIAGNOSIS — Z91.89 AT RISK FOR HEART DISEASE: ICD-10-CM

## 2022-12-30 RX ORDER — SIMVASTATIN 40 MG
40 TABLET ORAL AT BEDTIME
Qty: 30 TABLET | Refills: 0 | Status: SHIPPED | OUTPATIENT
Start: 2022-12-30 | End: 2023-01-30

## 2022-12-30 NOTE — TELEPHONE ENCOUNTER
Medication requested: simvastatin (ZOCOR) 40 MG tablet  Last office visit: 10/28/21  [facility] appointments: none  Medication last refilled: 12/1/22; 30 + 0 refills  Last qualifying labs:   Component      Latest Ref Rng & Units 4/15/2022   Cholesterol      <200 mg/dL 157   Triglycerides      <150 mg/dL 154 (H)   HDL Cholesterol      >=40 mg/dL 40   LDL Cholesterol Calculated      <=100 mg/dL 86   Non HDL Cholesterol      <130 mg/dL 117   Patient Fasting > 8hrs?       Yes     Medication is being filled for 1 time refill only due to:  Patient needs to be seen because it has been more than one year since last visit.    Sent message to pt to schedule appointment with Dr. Bryan for further refills.    Parvez NEWTON, RN  12/30/22 3:14 PM

## 2023-01-28 ASSESSMENT — ENCOUNTER SYMPTOMS
DIARRHEA: 0
MYALGIAS: 1
HEARTBURN: 0
NERVOUS/ANXIOUS: 1
ABDOMINAL PAIN: 0
DYSURIA: 0
DIZZINESS: 0
SORE THROAT: 0
CHILLS: 0
JOINT SWELLING: 0
WEAKNESS: 0
SHORTNESS OF BREATH: 0
EYE PAIN: 0
FEVER: 0
CONSTIPATION: 0
HEMATOCHEZIA: 0
HEADACHES: 0
HEMATURIA: 0
PALPITATIONS: 0
COUGH: 0
ARTHRALGIAS: 0
PARESTHESIAS: 0
FREQUENCY: 1
NAUSEA: 0

## 2023-01-28 ASSESSMENT — ANXIETY QUESTIONNAIRES
5. BEING SO RESTLESS THAT IT IS HARD TO SIT STILL: NOT AT ALL
GAD7 TOTAL SCORE: 11
6. BECOMING EASILY ANNOYED OR IRRITABLE: SEVERAL DAYS
1. FEELING NERVOUS, ANXIOUS, OR ON EDGE: MORE THAN HALF THE DAYS
4. TROUBLE RELAXING: MORE THAN HALF THE DAYS
3. WORRYING TOO MUCH ABOUT DIFFERENT THINGS: MORE THAN HALF THE DAYS
7. FEELING AFRAID AS IF SOMETHING AWFUL MIGHT HAPPEN: MORE THAN HALF THE DAYS
2. NOT BEING ABLE TO STOP OR CONTROL WORRYING: MORE THAN HALF THE DAYS
7. FEELING AFRAID AS IF SOMETHING AWFUL MIGHT HAPPEN: MORE THAN HALF THE DAYS
GAD7 TOTAL SCORE: 11
IF YOU CHECKED OFF ANY PROBLEMS ON THIS QUESTIONNAIRE, HOW DIFFICULT HAVE THESE PROBLEMS MADE IT FOR YOU TO DO YOUR WORK, TAKE CARE OF THINGS AT HOME, OR GET ALONG WITH OTHER PEOPLE: VERY DIFFICULT
GAD7 TOTAL SCORE: 11
8. IF YOU CHECKED OFF ANY PROBLEMS, HOW DIFFICULT HAVE THESE MADE IT FOR YOU TO DO YOUR WORK, TAKE CARE OF THINGS AT HOME, OR GET ALONG WITH OTHER PEOPLE?: VERY DIFFICULT

## 2023-01-28 ASSESSMENT — PATIENT HEALTH QUESTIONNAIRE - PHQ9
SUM OF ALL RESPONSES TO PHQ QUESTIONS 1-9: 11
SUM OF ALL RESPONSES TO PHQ QUESTIONS 1-9: 11
10. IF YOU CHECKED OFF ANY PROBLEMS, HOW DIFFICULT HAVE THESE PROBLEMS MADE IT FOR YOU TO DO YOUR WORK, TAKE CARE OF THINGS AT HOME, OR GET ALONG WITH OTHER PEOPLE: SOMEWHAT DIFFICULT

## 2023-01-29 ASSESSMENT — ENCOUNTER SYMPTOMS
JOINT SWELLING: 0
DIARRHEA: 0
PARESTHESIAS: 0
FREQUENCY: 1
HEADACHES: 0
ARTHRALGIAS: 0
NERVOUS/ANXIOUS: 1
EYE PAIN: 0
SHORTNESS OF BREATH: 0
CHILLS: 0
NAUSEA: 0
DYSURIA: 0
SORE THROAT: 0
MYALGIAS: 1
DIZZINESS: 0
FEVER: 0
WEAKNESS: 0
ABDOMINAL PAIN: 0
HEMATOCHEZIA: 0
COUGH: 0
HEARTBURN: 0
PALPITATIONS: 0
HEMATURIA: 0
CONSTIPATION: 0

## 2023-01-29 NOTE — PROGRESS NOTES
SUBJECTIVE:   CC: Kun is an 52 year old who presents for preventative health visit.     Healthy Habits:     Getting at least 3 servings of Calcium per day:  Yes    Bi-annual eye exam:  Yes    Dental care twice a year:  Yes    Sleep apnea or symptoms of sleep apnea:  Excessive snoring    Diet:  Regular (no restrictions)    Frequency of exercise:  6-7 days/week    Taking medications regularly:  No    Barriers to taking medications:  Other    Medication side effects:  Lightheadedness    PHQ-2 Total Score: 4    Additional concerns today:  Yes    # MDD  # OCD  # Insomnia  - last visit 12/6/21  - tried taking CBD/THC gumies for insomnia, but does not like how he feels in the morning  - Reports depression has been pretty rough. Lack of focus, not able to do basic tasks.   - Reports sleeping pretty well recently, gets 7-8 hours and normally feels refreshed.   - Recent weight gain  - Goes to therapy 1/3 weeks, decreased due to insurance coverage   - Worse in the winter   - Feels like its the same every year, always better in the summer  - Has tried multiple antidepressants in the past and has multiple side-effects (not interested in re-trying at this time)  - Denies suicidal thoughts    Previous Regimens  Prozac - weight gain, hypersomnia, sexual side effects  Paxil and Wellbutrin - had a lot SI  Celexa  Effexor   Sertraline - tried 25mg for 10 days in 11/2021, caused lightheadedness so he stopped    PHQ 12/6/2021 9/23/2022 1/28/2023   PHQ-9 Total Score 13 1 11   Q9: Thoughts of better off dead/self-harm past 2 weeks Several days Not at all Several days   F/U: Thoughts of suicide or self-harm - - Yes   F/U: Self harm-plan - - No   F/U: Self-harm action - - No   F/U: Safety concerns - - No     IDALIA-7 SCORE 10/22/2021 12/6/2021 1/28/2023   Total Score - - 11 (moderate anxiety)   Total Score 21 12 11     # Weight Gain  Wt Readings from Last 3 Encounters:   01/30/23 105.7 kg (233 lb)   12/06/21 106.6 kg (235 lb)   10/28/21  106.9 kg (235 lb 12 oz)     - Has gained about 15lbs since last summer (2022), desires to be around 205 lbs over the span of the year.   - Thinks it is related to the depression  - Does not have a gym in the winter, and does not have much movement    - Normally walks his dogs twice per day for exercise (normally about 2 miles every day, but winter is very reduced).   - Diet is stable. Has been eating more fast-feed than normal because he has been traveling. At home, has chicken, salad, fish. Not lots of red meat (1/3 weeks).      # High Cholesterol  Recent Labs   Lab Test 04/15/22  0923 08/18/20  0943 12/03/15  0848 01/25/15  0907   CHOL 157 243* 292.0* 123   HDL 40 49 62.0 10*   LDL 86 162* 180.0* 56   TRIG 154* 164* 252.0* 285*   CHOLHDLRATIO  --   --  4.7 12.5*     - started on simvastatin 40mg daily after 08/2020 labs  - occassionally misses doses while on a road trip (reports 90% use)    # Elevated Blood pressure  BP Readings from Last 3 Encounters:   01/30/23 132/88   10/28/21 114/80   10/22/21 130/89   - Mildly elevated blood pressure    # Right hearing loss  - Ruptured eardrum when he was a kid  - Notices decreased hearing in the right  - Mentions that his hearing was about 70%  - Last hearing test was 10 years go  - Not interested in getting hearing checked at this time    # Increased Urinary frequency  - Chronic, since high school  - Thinks he had cystoscopy in high school  - No recent changes in symtpoms  - Drinking 40 oz of water per day  - Has to pee every time he wakes up at night, but if he sleeps throughout the night  - Thinks it is related to anxiety  - Does not want to try medication at this time, does not think it is irritating enough currently.     Today's PHQ-2 Score:   PHQ-2 ( 1999 Pfizer) 1/28/2023   Q1: Little interest or pleasure in doing things 2   Q2: Feeling down, depressed or hopeless 2   PHQ-2 Score 4   PHQ-2 Total Score (12-17 Years)- Positive if 3 or more points; Administer PHQ-A if  positive -   Q1: Little interest or pleasure in doing things More than half the days   Q2: Feeling down, depressed or hopeless More than half the days   PHQ-2 Score 4     Have you ever done Advance Care Planning? (For example, a Health Directive, POLST, or a discussion with a medical provider or your loved ones about your wishes): Yes, patient states has an Advance Care Planning document and will bring a copy to the clinic.    Social History     Tobacco Use     Smoking status: Former     Types: Cigarettes     Start date: 1991     Quit date: 2003     Years since quittin.2     Smokeless tobacco: Never   Substance Use Topics     Alcohol use: Yes     Alcohol/week: 4.0 standard drinks     Types: 4 Cans of beer per week     Comment: Approximately 4-7 drinks weekly       Alcohol Use 2023   Prescreen: >3 drinks/day or >7 drinks/week? No     Reviewed orders with patient. Reviewed health maintenance and updated orders accordingly - Yes    Reviewed and updated as needed this visit by clinical staff   Tobacco  Allergies  Meds   Med Hx  Surg Hx  Fam Hx  Soc Hx      Reviewed and updated as needed this visit by Provider   Tobacco     Med Hx  Surg Hx  Fam Hx         Review of Systems   Constitutional: Negative for chills and fever.   HENT: Positive for hearing loss. Negative for congestion, ear pain and sore throat.    Eyes: Negative for pain and visual disturbance.   Respiratory: Negative for cough and shortness of breath.    Cardiovascular: Negative for chest pain, palpitations and peripheral edema.   Gastrointestinal: Negative for abdominal pain, constipation, diarrhea, heartburn, hematochezia and nausea.   Genitourinary: Positive for frequency and urgency. Negative for dysuria, genital sores, hematuria, impotence and penile discharge.   Musculoskeletal: Positive for myalgias. Negative for arthralgias and joint swelling.   Skin: Negative for rash.   Neurological: Negative for dizziness, weakness,  "headaches and paresthesias.   Psychiatric/Behavioral: Positive for mood changes. The patient is nervous/anxious.      OBJECTIVE:   /88 (BP Location: Right arm, Patient Position: Right side, Cuff Size: Adult Large)   Pulse 70   Temp 97.1  F (36.2  C) (Skin)   Resp 16   Ht 1.727 m (5' 8\")   Wt 105.7 kg (233 lb)   SpO2 97%   BMI 35.43 kg/m      Physical Exam  GENERAL: Healthy, alert and no distress  EYES: Eyes grossly normal to inspection, PERRL and conjunctivae and sclerae normal  HENT: Ear canals and TM's normal, nose and mouth without ulcers or lesions  NECK: No adenopathy, no asymmetry, masses, or scars and thyroid normal to palpation  RESP: Lungs clear to auscultation - no rales, rhonchi or wheezes  CV: Regular rate and rhythm, normal S1 S2, no S3 or S4, no murmur, click or rub, no peripheral edema and peripheral pulses strong  ABDOMEN: Soft, nontender, no hepatosplenomegaly, no masses and bowel sounds normal  MS: No gross musculoskeletal defects noted, no edema  SKIN: No suspicious lesions or rashes  NEURO: Normal strength and tone, mentation intact and speech normal  PSYCH: Mentation appears normal, affect normal/bright    Labs reviewed in Epic    ASSESSMENT/PLAN:   (E78.00) Hypercholesterolemia  (primary encounter diagnosis)  Comment: Patient has family history of heart disease and a PMH of high cholesterol. Currently taking simvastatin 40mg daily. Cholesterol level significantly decreased after starting statin therapy. If cholesterol returns high, consider high intensity statin.   Plan:    - Lipid panel reflex to direct LDL Fasting        (R63.5) Weight gain  Comment: Likely multifactorial due to MDD and lack of physical activity in the winter. Does not want to go to the gym or walk outside for long due to the cold. Discussed trying to get a stationary bike for at home to increase his physical activity. Set a weight loss goal of 20 lbs over the next year.   Plan:    - Increase physical activity " (possibly bike)    (R03.0) Elevated blood pressure reading without diagnosis of hypertension  Comment: Patient's blood pressure was mildly elevated to 132/89. Increased risk for heart disease with  a strong family history of coronary artery disease and his current level of physical activity. If blood pressure remains elevated, consider first-line hypertensive therapy.  Plan:    - Basic metabolic panel          (Z12.11) Screen for colon cancer  Comment: Family history of colon cancer. Has never had colon cancer screening. Desires to start with the Cologuard and advance to colonoscopy if necessary.   Plan:    - COLOGUARD([employer])          (Z23) Needs flu shot  Plan:    - INFLUENZA VACCINE >6 MONTHS (AFLURIA/FLUZONE)          (Z91.89) At risk for heart disease  Comment: Strong family history of heart disease and elevated cholesterol. Last labs were within normal limits after starting the simvastatin.   Plan:    - simvastatin (ZOCOR) 40 MG tablet         (F33.2) Severe episode of recurrent major depressive disorder, without psychotic features (H)  Comment: Chronic, since childhood. Has tried multiple selective serotonin reuptake inhibitors and bupropion in the past. No symptom improvement with medication and significant side-effects. Sees therapist 1/3 weeks and endorses symptom improvement for 1 week after the appointment. Had to decrease the amount of therapy due to insurance coverage. Does not want to re-try medication at this time, but would like to see if other treatment options are available.   Plan:   - Adult Mental Health  Referral          (F42.9) Obsessive-compulsive disorder, unspecified type  Comment: Chronic, since childhood. Has tried multiple selective serotonin reuptake inhibitors and bupropion in the past. No symptom improvement with medication and significant side-effects. Does not desire to try additional medication at this time.   Plan:    - Adult Mental Health  Referral          COUNSELING:   Reviewed preventive health counseling, as reflected in patient instructions      He reports that he quit smoking about 19 years ago. His smoking use included cigarettes. He started smoking about 31 years ago. He has never used smokeless tobacco.    Gilbert Gaytan, MS3  [employer]     I was present with the medical student who participated in the service and in the documentation of the note. I have verified the history and personally performed the physical exam and medical decision making. I agree with the assessment and plan of care as documented in the note with the following additions:  (Z00.00) Annual physical exam  (primary encounter diagnosis)  Comment: Age appropriates screening and preventive services provided.  Recommended shingles vaccination.   Plan:     (E78.00) Hypercholesterolemia  Comment: Plan: Lipid panel reflex to direct LDL Fasting          (Z68.35) BMI 35.0-35.9,adult  Comment: John has been successful previous losing weight with lifestyle changes, just has a hard time maintaining into the winter months. We strategized ways to improving movement/exercise int he winter. Considering buying a stationary bike, which I think would be reasonable. Check labs today to see if there's any indication of a weight-related health concern.   Plan:     (R03.0) Elevated blood pressure reading without diagnosis of hypertension  Comment: Plan: Basic metabolic panel          (Z12.11) Screen for colon cancer  Comment: Discussed colon cancer screening options.   Plan: COLOGUARD([employer])          (Z23) Needs flu shot  Comment: Plan: INFLUENZA VACCINE >6 MONTHS (AFLURIA/FLUZONE)          (Z91.89) At risk for heart disease  Comment: Plan: simvastatin (ZOCOR) 40 MG tablet         (F33.2) Severe episode of recurrent major depressive disorder, without psychotic features (H)  (F42.9) Obsessive-compulsive disorder, unspecified type  Comment: Chronic, not at goal.  John has not  done well with multiple medications for depression and OCD.  Recommended he meet with psychiatry to get their thoughts on next steps and he was open to this. [facility] referral placed.   Plan: Adult Mental Health  Referral    (R35.0) Urinary frequency  Comment: Not currently bothersome enough that he wants anything done about it. Let him know about evaluation and management options for LUTS, OAB. Asked him to let me know if it gets worse.   Plan:     (H91.91) Hearing loss of right ear, unspecified hearing loss type  Comment: Not currently bothersome enough that he wants anything done about it. Let him know about audiology evaluation being an option should he desire it in the future.   Plan:     Hoang Bryan MD  4:32 PM, January 31, 2023

## 2023-01-30 ENCOUNTER — LAB (OUTPATIENT)
Dept: FAMILY MEDICINE | Facility: CLINIC | Age: 53
End: 2023-01-30

## 2023-01-30 ENCOUNTER — OFFICE VISIT (OUTPATIENT)
Dept: FAMILY MEDICINE | Facility: CLINIC | Age: 53
End: 2023-01-30
Payer: COMMERCIAL

## 2023-01-30 VITALS
RESPIRATION RATE: 16 BRPM | TEMPERATURE: 97.1 F | HEART RATE: 70 BPM | HEIGHT: 68 IN | SYSTOLIC BLOOD PRESSURE: 132 MMHG | DIASTOLIC BLOOD PRESSURE: 88 MMHG | WEIGHT: 233 LBS | OXYGEN SATURATION: 97 % | BODY MASS INDEX: 35.31 KG/M2

## 2023-01-30 DIAGNOSIS — Z91.89 AT RISK FOR HEART DISEASE: ICD-10-CM

## 2023-01-30 DIAGNOSIS — R03.0 ELEVATED BLOOD PRESSURE READING WITHOUT DIAGNOSIS OF HYPERTENSION: ICD-10-CM

## 2023-01-30 DIAGNOSIS — Z00.00 ANNUAL PHYSICAL EXAM: Primary | ICD-10-CM

## 2023-01-30 DIAGNOSIS — Z12.11 SCREEN FOR COLON CANCER: ICD-10-CM

## 2023-01-30 DIAGNOSIS — E78.00 HYPERCHOLESTEROLEMIA: ICD-10-CM

## 2023-01-30 DIAGNOSIS — Z23 NEEDS FLU SHOT: ICD-10-CM

## 2023-01-30 DIAGNOSIS — F33.2 SEVERE EPISODE OF RECURRENT MAJOR DEPRESSIVE DISORDER, WITHOUT PSYCHOTIC FEATURES (H): Chronic | ICD-10-CM

## 2023-01-30 DIAGNOSIS — R35.0 URINARY FREQUENCY: ICD-10-CM

## 2023-01-30 DIAGNOSIS — H91.91 HEARING LOSS OF RIGHT EAR, UNSPECIFIED HEARING LOSS TYPE: ICD-10-CM

## 2023-01-30 DIAGNOSIS — F42.9 OBSESSIVE-COMPULSIVE DISORDER, UNSPECIFIED TYPE: Chronic | ICD-10-CM

## 2023-01-30 LAB
ANION GAP SERPL CALCULATED.3IONS-SCNC: 12 MMOL/L (ref 7–15)
BUN SERPL-MCNC: 12.7 MG/DL (ref 6–20)
CALCIUM SERPL-MCNC: 9.3 MG/DL (ref 8.6–10)
CHLORIDE SERPL-SCNC: 105 MMOL/L (ref 98–107)
CHOLEST SERPL-MCNC: 198 MG/DL
CREAT SERPL-MCNC: 1.1 MG/DL (ref 0.67–1.17)
DEPRECATED HCO3 PLAS-SCNC: 24 MMOL/L (ref 22–29)
GFR SERPL CREATININE-BSD FRML MDRD: 82 ML/MIN/1.73M2
GLUCOSE SERPL-MCNC: 83 MG/DL (ref 70–99)
HDLC SERPL-MCNC: 46 MG/DL
LDLC SERPL CALC-MCNC: 119 MG/DL
NONHDLC SERPL-MCNC: 152 MG/DL
POTASSIUM SERPL-SCNC: 4.5 MMOL/L (ref 3.4–5.3)
SODIUM SERPL-SCNC: 141 MMOL/L (ref 136–145)
TRIGL SERPL-MCNC: 166 MG/DL

## 2023-01-30 PROCEDURE — 80061 LIPID PANEL: CPT | Performed by: FAMILY MEDICINE

## 2023-01-30 PROCEDURE — 80048 BASIC METABOLIC PNL TOTAL CA: CPT | Performed by: FAMILY MEDICINE

## 2023-01-30 RX ORDER — SIMVASTATIN 40 MG
40 TABLET ORAL AT BEDTIME
Qty: 90 TABLET | Refills: 3 | Status: SHIPPED | OUTPATIENT
Start: 2023-01-30 | End: 2024-03-14

## 2023-01-30 NOTE — NURSING NOTE
"Injectable Influenza Immunization Documentation    1.  Has the patient received the information for the injectable influenza vaccine? YES     2. Is the patient 6 months of age or older? YES     3. Does the patient have any of the following contraindications?         Severe allergy to eggs? No     Severe allergic reaction to previous influenza vaccines? No   Severe allergy to latex? No       History of Guillain-[place] syndrome? No     Currently have a temperature greater than 100.4F? No        4.  Severely egg allergic patients should have flu vaccine eligibility assessed by an MD, RN, or pharmacist, and those who received flu vaccine should be observed for 15 min by an MD, RN, Pharmacist, Medical Technician, or member of clinic staff.\": YES    5. Latex-allergic patients should be given latex-free influenza vaccine Yes. Please reference the Vaccine latex table to determine if your clinic s product is latex-containing.       Vaccination given by Fernanda Silva LPN on 1/30/2023 at 2:58 PM          "

## 2023-01-30 NOTE — PATIENT INSTRUCTIONS
1) Get the bivalent COVID-19 booster.    2) The psychiatry team should call you to set up an appointment or you can call [phone]    3) Get a shingles vaccine. It's 2 doses, 2-6 months apart

## 2023-01-30 NOTE — NURSING NOTE
"52 year old  Chief Complaint   Patient presents with     Physical     Questions on colonoscopy and when to get one.       Blood pressure (!) 149/90, pulse 66, temperature 97.1  F (36.2  C), temperature source Skin, resp. rate 16, height 1.727 m (5' 8\"), weight 105.7 kg (233 lb), SpO2 97 %. Body mass index is 35.43 kg/m .  Patient Active Problem List   Diagnosis     Insomnia     Family history of ischemic heart disease     Severe episode of recurrent major depressive disorder, without psychotic features (H)     Obsessive-compulsive disorder, unspecified type       Wt Readings from Last 2 Encounters:   23 105.7 kg (233 lb)   21 106.6 kg (235 lb)     BP Readings from Last 3 Encounters:   23 (!) 149/90   10/28/21 114/80   10/22/21 130/89         Current Outpatient Medications   Medication     simvastatin (ZOCOR) 40 MG tablet     No current facility-administered medications for this visit.       Social History     Tobacco Use     Smoking status: Former     Types: Cigarettes     Quit date: 2003     Years since quittin.2     Smokeless tobacco: Never   Vaping Use     Vaping Use: Never used   Substance Use Topics     Alcohol use: Yes     Comment: Approximately 2 times weekly.     Drug use: No       Health Maintenance Due   Topic Date Due     ADVANCE CARE PLANNING  Never done     DEPRESSION ACTION PLAN  Never done     HEPATITIS B IMMUNIZATION (1 of 3 - 3-dose series) Never done     COLORECTAL CANCER SCREENING  Never done     YEARLY PREVENTIVE VISIT  2021     COVID-19 Vaccine (5 - Booster for Pfizer series) 06/10/2022     INFLUENZA VACCINE (1) 2022     ZOSTER IMMUNIZATION (1 of 2) 02/10/2022       No results found for: PAP      2023 1:02 PM    "

## 2023-02-23 LAB — NONINV COLON CA DNA+OCC BLD SCRN STL QL: NEGATIVE

## 2023-03-19 ENCOUNTER — HOSPITAL ENCOUNTER (EMERGENCY)
Facility: CLINIC | Age: 53
Discharge: HOME | End: 2023-03-19
Attending: EMERGENCY MEDICINE | Admitting: EMERGENCY MEDICINE
Payer: COMMERCIAL

## 2023-03-19 VITALS
OXYGEN SATURATION: 97 % | WEIGHT: 228 LBS | DIASTOLIC BLOOD PRESSURE: 105 MMHG | HEART RATE: 58 BPM | SYSTOLIC BLOOD PRESSURE: 148 MMHG | TEMPERATURE: 98.1 F | RESPIRATION RATE: 12 BRPM | HEIGHT: 68 IN | BODY MASS INDEX: 34.56 KG/M2

## 2023-03-19 DIAGNOSIS — K29.70 GASTRITIS WITHOUT BLEEDING, UNSPECIFIED CHRONICITY, UNSPECIFIED GASTRITIS TYPE: ICD-10-CM

## 2023-03-19 LAB
ALBUMIN SERPL BCG-MCNC: 4.8 G/DL (ref 3.5–5.2)
ALP SERPL-CCNC: 67 U/L (ref 40–129)
ALT SERPL W P-5'-P-CCNC: 46 U/L (ref 10–50)
ANION GAP SERPL CALCULATED.3IONS-SCNC: 11 MMOL/L (ref 7–15)
AST SERPL W P-5'-P-CCNC: 32 U/L (ref 10–50)
BASOPHILS # BLD AUTO: 0.1 10E3/UL (ref 0–0.2)
BASOPHILS NFR BLD AUTO: 1 %
BILIRUB SERPL-MCNC: 0.5 MG/DL
BUN SERPL-MCNC: 15.6 MG/DL (ref 6–20)
CALCIUM SERPL-MCNC: 9.7 MG/DL (ref 8.6–10)
CHLORIDE SERPL-SCNC: 101 MMOL/L (ref 98–107)
CREAT SERPL-MCNC: 1.46 MG/DL (ref 0.67–1.17)
DEPRECATED HCO3 PLAS-SCNC: 26 MMOL/L (ref 22–29)
EOSINOPHIL # BLD AUTO: 0.5 10E3/UL (ref 0–0.7)
EOSINOPHIL NFR BLD AUTO: 5 %
ERYTHROCYTE [DISTWIDTH] IN BLOOD BY AUTOMATED COUNT: 12.2 % (ref 10–15)
GFR SERPL CREATININE-BSD FRML MDRD: 58 ML/MIN/1.73M2
GLUCOSE SERPL-MCNC: 106 MG/DL (ref 70–99)
HCT VFR BLD AUTO: 44.4 % (ref 40–53)
HGB BLD-MCNC: 14.8 G/DL (ref 13.3–17.7)
IMM GRANULOCYTES # BLD: 0 10E3/UL
IMM GRANULOCYTES NFR BLD: 0 %
LIPASE SERPL-CCNC: 44 U/L (ref 13–60)
LYMPHOCYTES # BLD AUTO: 3.7 10E3/UL (ref 0.8–5.3)
LYMPHOCYTES NFR BLD AUTO: 41 %
MCH RBC QN AUTO: 29.1 PG (ref 26.5–33)
MCHC RBC AUTO-ENTMCNC: 33.3 G/DL (ref 31.5–36.5)
MCV RBC AUTO: 87 FL (ref 78–100)
MONOCYTES # BLD AUTO: 0.7 10E3/UL (ref 0–1.3)
MONOCYTES NFR BLD AUTO: 8 %
NEUTROPHILS # BLD AUTO: 4.1 10E3/UL (ref 1.6–8.3)
NEUTROPHILS NFR BLD AUTO: 45 %
NRBC # BLD AUTO: 0 10E3/UL
NRBC BLD AUTO-RTO: 0 /100
PLATELET # BLD AUTO: 220 10E3/UL (ref 150–450)
POTASSIUM SERPL-SCNC: 4.1 MMOL/L (ref 3.4–5.3)
PROT SERPL-MCNC: 7.6 G/DL (ref 6.4–8.3)
RBC # BLD AUTO: 5.09 10E6/UL (ref 4.4–5.9)
SODIUM SERPL-SCNC: 138 MMOL/L (ref 136–145)
WBC # BLD AUTO: 9.1 10E3/UL (ref 4–11)

## 2023-03-19 PROCEDURE — 250N000013 HC RX MED GY IP 250 OP 250 PS 637: Performed by: EMERGENCY MEDICINE

## 2023-03-19 PROCEDURE — 36415 COLL VENOUS BLD VENIPUNCTURE: CPT | Performed by: EMERGENCY MEDICINE

## 2023-03-19 PROCEDURE — 258N000003 HC RX IP 258 OP 636: Performed by: EMERGENCY MEDICINE

## 2023-03-19 PROCEDURE — 250N000009 HC RX 250: Performed by: EMERGENCY MEDICINE

## 2023-03-19 PROCEDURE — 85025 COMPLETE CBC W/AUTO DIFF WBC: CPT | Performed by: EMERGENCY MEDICINE

## 2023-03-19 PROCEDURE — 99283 EMERGENCY DEPT VISIT LOW MDM: CPT | Performed by: EMERGENCY MEDICINE

## 2023-03-19 PROCEDURE — 83690 ASSAY OF LIPASE: CPT | Performed by: EMERGENCY MEDICINE

## 2023-03-19 PROCEDURE — 80053 COMPREHEN METABOLIC PANEL: CPT | Performed by: EMERGENCY MEDICINE

## 2023-03-19 RX ORDER — SODIUM CHLORIDE 9 MG/ML
INJECTION, SOLUTION INTRAVENOUS CONTINUOUS
Status: DISCONTINUED | OUTPATIENT
Start: 2023-03-19 | End: 2023-03-19 | Stop reason: HOSPADM

## 2023-03-19 RX ADMIN — SODIUM CHLORIDE 1000 ML: 9 INJECTION, SOLUTION INTRAVENOUS at 03:01

## 2023-03-19 RX ADMIN — LIDOCAINE HYDROCHLORIDE 30 ML: 20 SOLUTION ORAL; TOPICAL at 02:35

## 2023-03-19 ASSESSMENT — ACTIVITIES OF DAILY LIVING (ADL): ADLS_ACUITY_SCORE: 35

## 2023-03-19 NOTE — ED PROVIDER NOTES
ED Provider Note  [facility]      History     Chief Complaint   Patient presents with     Abdominal Pain     Pt woke up with pain on frontal side of stomach. Intermittent pain. Pt reports taking tums to no relief.      HPI  Kun Ordaz is a 53 year old male who presents to us with a chief complaint of abdominal pain.  Patient woke up initially 24 hours ago overnight he had diffuse abdominal bloating and upper abdominal burning.  This lasted for few hours then resolved.  He was fine throughout the day.  This evening again at approximately 1 AM he started to have pain again.  This lasted for an hour or 2 and is also improving at this point.  No vomiting.  No fevers or chills.  No previous surgical history in the abdomen.  No chest pain or shortness of breath.    Past Medical History  Past Medical History:   Diagnosis Date     Depressive disorder, not elsewhere classified     quit treatment in 2001     Tracie Starkey infection 2015    hospitalized     OCD (obsessive compulsive disorder)      Suicide attempt (H)     last in college     Past Surgical History:   Procedure Laterality Date     [place] teeth removal  1990     omeprazole (PRILOSEC) 20 MG DR capsule  simvastatin (ZOCOR) 40 MG tablet      Allergies   Allergen Reactions     No Known Drug Allergies      Family History  Family History   Problem Relation Age of Onset     Hyperlipidemia Mother      Macular Degeneration Mother      Colon Polyps Mother      Hypertension Father      C.A.D. Father 42     Hyperlipidemia Father      Depression Sister      Skin Cancer Sister         squamous cell     Pancreatic Cancer Maternal Grandmother      Colon Cancer Maternal Grandmother      Cancer Maternal Grandfather         smoked, unknown type     Cerebrovascular Disease Maternal Grandfather      Coronary Artery Disease Paternal Grandfather 55     Peripheral Vascular Disease Paternal Aunt         bilateral bka      Prostate Cancer No family hx of   "    Social History   Social History     Tobacco Use     Smoking status: Former     Types: Cigarettes     Start date: 1991     Quit date: 2003     Years since quittin.3     Smokeless tobacco: Never   Vaping Use     Vaping Use: Never used   Substance Use Topics     Alcohol use: Yes     Alcohol/week: 4.0 standard drinks     Types: 4 Cans of beer per week     Comment: Approximately 4-7 drinks weekly     Drug use: No         A medically appropriate review of systems was performed with pertinent positives and negatives noted in the HPI, and all other systems negative.    Physical Exam   BP: (!) 165/107  Pulse: 65  Temp: 98.1  F (36.7  C)  Resp: 18  Height: 172.7 cm (5' 8\")  Weight: 103.4 kg (228 lb)  SpO2: 97 %  Physical Exam  Vitals and nursing note reviewed.   Constitutional:       General: He is not in acute distress.     Appearance: He is well-developed.   HENT:      Head: Normocephalic.      Right Ear: External ear normal.      Left Ear: External ear normal.      Nose: Nose normal.   Eyes:      Extraocular Movements: Extraocular movements intact.      Conjunctiva/sclera: Conjunctivae normal.   Pulmonary:      Effort: Pulmonary effort is normal. No respiratory distress.   Abdominal:      General: Abdomen is flat. There is no distension.      Tenderness: There is abdominal tenderness in the epigastric area.   Musculoskeletal:         General: No deformity. Normal range of motion.      Cervical back: Normal range of motion and neck supple.   Skin:     General: Skin is warm and dry.   Neurological:      Mental Status: He is alert. Mental status is at baseline.      Comments: Oriented   Psychiatric:         Mood and Affect: Mood normal.         Behavior: Behavior normal.           ED Course, Procedures, & Data      Procedures                Results for orders placed or performed during the hospital encounter of 23   Comprehensive metabolic panel     Status: Abnormal   Result Value Ref Range    Sodium " 138 136 - 145 mmol/L    Potassium 4.1 3.4 - 5.3 mmol/L    Chloride 101 98 - 107 mmol/L    Carbon Dioxide (CO2) 26 22 - 29 mmol/L    Anion Gap 11 7 - 15 mmol/L    Urea Nitrogen 15.6 6.0 - 20.0 mg/dL    Creatinine 1.46 (H) 0.67 - 1.17 mg/dL    Calcium 9.7 8.6 - 10.0 mg/dL    Glucose 106 (H) 70 - 99 mg/dL    Alkaline Phosphatase 67 40 - 129 U/L    AST 32 10 - 50 U/L    ALT 46 10 - 50 U/L    Protein Total 7.6 6.4 - 8.3 g/dL    Albumin 4.8 3.5 - 5.2 g/dL    Bilirubin Total 0.5 <=1.2 mg/dL    GFR Estimate 58 (L) >60 mL/min/1.73m2   Lipase     Status: Normal   Result Value Ref Range    Lipase 44 13 - 60 U/L   CBC with platelets and differential     Status: None   Result Value Ref Range    WBC Count 9.1 4.0 - 11.0 10e3/uL    RBC Count 5.09 4.40 - 5.90 10e6/uL    Hemoglobin 14.8 13.3 - 17.7 g/dL    Hematocrit 44.4 40.0 - 53.0 %    MCV 87 78 - 100 fL    MCH 29.1 26.5 - 33.0 pg    MCHC 33.3 31.5 - 36.5 g/dL    RDW 12.2 10.0 - 15.0 %    Platelet Count 220 150 - 450 10e3/uL    % Neutrophils 45 %    % Lymphocytes 41 %    % Monocytes 8 %    % Eosinophils 5 %    % Basophils 1 %    % Immature Granulocytes 0 %    NRBCs per 100 WBC 0 <1 /100    Absolute Neutrophils 4.1 1.6 - 8.3 10e3/uL    Absolute Lymphocytes 3.7 0.8 - 5.3 10e3/uL    Absolute Monocytes 0.7 0.0 - 1.3 10e3/uL    Absolute Eosinophils 0.5 0.0 - 0.7 10e3/uL    Absolute Basophils 0.1 0.0 - 0.2 10e3/uL    Absolute Immature Granulocytes 0.0 <=0.4 10e3/uL    Absolute NRBCs 0.0 10e3/uL   CBC with platelets differential     Status: None    Narrative    The following orders were created for panel order CBC with platelets differential.  Procedure                               Abnormality         Status                     ---------                               -----------         ------                     CBC with platelets and d...[[id number]]                      Final result                 Please view results for these tests on the individual orders.     Medications   0.9%  sodium chloride BOLUS (1,000 mLs Intravenous $New Bag 3/19/23 0301)     Followed by   sodium chloride 0.9% infusion (has no administration in time range)   lidocaine (viscous) (XYLOCAINE) 2 % 15 mL, alum & mag hydroxide-simethicone (MAALOX) 15 mL GI Cocktail (30 mLs Oral $Given 3/19/23 0235)     Labs Ordered and Resulted from Time of ED Arrival to Time of ED Departure   COMPREHENSIVE METABOLIC PANEL - Abnormal       Result Value    Sodium 138      Potassium 4.1      Chloride 101      Carbon Dioxide (CO2) 26      Anion Gap 11      Urea Nitrogen 15.6      Creatinine 1.46 (*)     Calcium 9.7      Glucose 106 (*)     Alkaline Phosphatase 67      AST 32      ALT 46      Protein Total 7.6      Albumin 4.8      Bilirubin Total 0.5      GFR Estimate 58 (*)    LIPASE - Normal    Lipase 44     CBC WITH PLATELETS AND DIFFERENTIAL    WBC Count 9.1      RBC Count 5.09      Hemoglobin 14.8      Hematocrit 44.4      MCV 87      MCH 29.1      MCHC 33.3      RDW 12.2      Platelet Count 220      % Neutrophils 45      % Lymphocytes 41      % Monocytes 8      % Eosinophils 5      % Basophils 1      % Immature Granulocytes 0      NRBCs per 100 WBC 0      Absolute Neutrophils 4.1      Absolute Lymphocytes 3.7      Absolute Monocytes 0.7      Absolute Eosinophils 0.5      Absolute Basophils 0.1      Absolute Immature Granulocytes 0.0      Absolute NRBCs 0.0       No orders to display          Medical Decision Making  The patient's presentation is strongly suggestive of moderate complexity (an acute illness with systemic symptoms).    The patient's evaluation involved:  review of external note(s) from 3+ sources (Most recent H&P in addition to clinic and ED note)  review of 2 test result(s) ordered prior to this encounter (Most recent BMP and CBC)    The patient's management involved moderate risk (prescription drug management including medications given in the ED).      Assessment & Plan    53-year-old male presents to us with chief  complaint of abdominal pain.  Differential includes but not limited to pancreatitis, cholecystitis, gastritis, gastroenteritis.  Patient was somewhat hypertensive here but vital signs are otherwise unremarkable.  No lower abdominal tenderness to suggest appendicitis.  Labs are interpreted.  These are significant for mild elevation in creatinine to 1.46.  Patient was given fluids in addition to a GI cocktail for his symptoms.  This significantly helped.  We will give him a prescription for omeprazole and have him follow-up with primary care for further treatment and to recheck the creatinine    I have reviewed the nursing notes. I have reviewed the findings, diagnosis, plan and need for follow up with the patient.    New Prescriptions    OMEPRAZOLE (PRILOSEC) 20 MG DR CAPSULE    Take 1 capsule (20 mg) by mouth daily for 30 days       Final diagnoses:   Gastritis without bleeding, unspecified chronicity, unspecified gastritis type       Elvin Hi  [facility] EMERGENCY DEPARTMENT  3/19/2023     Elvin Hi DO  03/19/23 0321

## 2023-03-19 NOTE — DISCHARGE INSTRUCTIONS
Your creatinine was mildly elevated today.  Please follow-up with your primary care doctor to recheck this level.  Your symptoms may be related to an ulcer or irritation in your stomach due to acid.  We will order a prescription for omeprazole for you to help with this.  Return to the emergency department as needed.

## 2023-03-19 NOTE — ED TRIAGE NOTES
Triage Assessment     Row Name 03/19/23 0211       Triage Assessment (Adult)    Airway WDL WDL       Respiratory WDL    Respiratory WDL WDL       Skin Circulation/Temperature WDL    Skin Circulation/Temperature WDL WDL       Cardiac WDL    Cardiac WDL X       Peripheral/Neurovascular WDL    Peripheral Neurovascular WDL WDL       Cognitive/Neuro/Behavioral WDL    Cognitive/Neuro/Behavioral WDL WDL

## 2024-03-14 DIAGNOSIS — Z91.89 AT RISK FOR HEART DISEASE: ICD-10-CM

## 2024-03-14 RX ORDER — SIMVASTATIN 40 MG
40 TABLET ORAL AT BEDTIME
Qty: 30 TABLET | Refills: 0 | Status: SHIPPED | OUTPATIENT
Start: 2024-03-14 | End: 2024-04-11

## 2024-03-14 NOTE — TELEPHONE ENCOUNTER
Medication requested: simvastatin (ZOCOR) 40 MG tablet   Last office visit: 1/30/23  [facility] appointments: none  Medication last refilled: 1/30/23; 90 + 3 refills  Last qualifying labs:   Component      Latest Ref Rng 4/15/2022  9:23 AM   Cholesterol      <200 mg/dL 157    Triglycerides      <150 mg/dL 154 (H)    HDL Cholesterol      >=40 mg/dL 40    LDL Cholesterol Calculated      <=100 mg/dL 86    Non HDL Cholesterol      <130 mg/dL 117    Patient Fasting? Yes      Medication is being filled for 1 time refill only due to:  Patient needs labs lipid panel. Patient needs to be seen because it has been more than one year since last visit.    Message sent to pt to schedule appt.    Parvez NEWTON, RN  03/14/24 10:05 AM

## 2024-04-06 ENCOUNTER — HEALTH MAINTENANCE LETTER (OUTPATIENT)
Age: 54
End: 2024-04-06

## 2024-04-10 DIAGNOSIS — Z91.89 AT RISK FOR HEART DISEASE: ICD-10-CM

## 2024-04-11 RX ORDER — SIMVASTATIN 40 MG
40 TABLET ORAL AT BEDTIME
Qty: 30 TABLET | Refills: 0 | Status: SHIPPED | OUTPATIENT
Start: 2024-04-11 | End: 2024-04-24

## 2024-04-11 NOTE — TELEPHONE ENCOUNTER
Simvastatin (Zocor) 40 mg    Last Office Visit: 1/30/23  [facility] [place]24/24  Medication last refilled: 3/14/24 #30 with 0 refill(s)    Required labs per protocol:    LAB REF RANGE 4/15/22 1/30/23   LDL < 100 mg/dL 86 119 High     Medication is being filled for 1 time refill only due to:   has upcoming appointment on 4/24/24    NICHOLAS NavarreteN, RN, CCM

## 2024-04-24 ENCOUNTER — OFFICE VISIT (OUTPATIENT)
Dept: FAMILY MEDICINE | Facility: CLINIC | Age: 54
End: 2024-04-24
Payer: COMMERCIAL

## 2024-04-24 VITALS
DIASTOLIC BLOOD PRESSURE: 86 MMHG | TEMPERATURE: 97.6 F | BODY MASS INDEX: 34.57 KG/M2 | SYSTOLIC BLOOD PRESSURE: 148 MMHG | WEIGHT: 228.08 LBS | HEIGHT: 68 IN | OXYGEN SATURATION: 93 % | HEART RATE: 60 BPM

## 2024-04-24 DIAGNOSIS — R03.0 ELEVATED BP WITHOUT DIAGNOSIS OF HYPERTENSION: ICD-10-CM

## 2024-04-24 DIAGNOSIS — Z00.00 ROUTINE GENERAL MEDICAL EXAMINATION AT A HEALTH CARE FACILITY: Primary | ICD-10-CM

## 2024-04-24 DIAGNOSIS — E78.00 HYPERCHOLESTEROLEMIA: ICD-10-CM

## 2024-04-24 DIAGNOSIS — F33.41 RECURRENT MAJOR DEPRESSIVE DISORDER, IN PARTIAL REMISSION (H): ICD-10-CM

## 2024-04-24 DIAGNOSIS — Z23 NEED FOR VACCINATION: ICD-10-CM

## 2024-04-24 PROCEDURE — 80061 LIPID PANEL: CPT | Mod: ORL | Performed by: FAMILY MEDICINE

## 2024-04-24 PROCEDURE — 80053 COMPREHEN METABOLIC PANEL: CPT | Performed by: FAMILY MEDICINE

## 2024-04-24 RX ORDER — SIMVASTATIN 40 MG
40 TABLET ORAL AT BEDTIME
Qty: 90 TABLET | Refills: 4 | Status: SHIPPED | OUTPATIENT
Start: 2024-04-24

## 2024-04-24 SDOH — HEALTH STABILITY: PHYSICAL HEALTH: ON AVERAGE, HOW MANY MINUTES DO YOU ENGAGE IN EXERCISE AT THIS LEVEL?: 60 MIN

## 2024-04-24 SDOH — HEALTH STABILITY: PHYSICAL HEALTH: ON AVERAGE, HOW MANY DAYS PER WEEK DO YOU ENGAGE IN MODERATE TO STRENUOUS EXERCISE (LIKE A BRISK WALK)?: 7 DAYS

## 2024-04-24 ASSESSMENT — PATIENT HEALTH QUESTIONNAIRE - PHQ9
SUM OF ALL RESPONSES TO PHQ QUESTIONS 1-9: 6
SUM OF ALL RESPONSES TO PHQ QUESTIONS 1-9: 6
10. IF YOU CHECKED OFF ANY PROBLEMS, HOW DIFFICULT HAVE THESE PROBLEMS MADE IT FOR YOU TO DO YOUR WORK, TAKE CARE OF THINGS AT HOME, OR GET ALONG WITH OTHER PEOPLE: SOMEWHAT DIFFICULT

## 2024-04-24 ASSESSMENT — ANXIETY QUESTIONNAIRES
GAD7 TOTAL SCORE: 6
2. NOT BEING ABLE TO STOP OR CONTROL WORRYING: SEVERAL DAYS
7. FEELING AFRAID AS IF SOMETHING AWFUL MIGHT HAPPEN: SEVERAL DAYS
3. WORRYING TOO MUCH ABOUT DIFFERENT THINGS: SEVERAL DAYS
1. FEELING NERVOUS, ANXIOUS, OR ON EDGE: SEVERAL DAYS
GAD7 TOTAL SCORE: 6
7. FEELING AFRAID AS IF SOMETHING AWFUL MIGHT HAPPEN: SEVERAL DAYS
8. IF YOU CHECKED OFF ANY PROBLEMS, HOW DIFFICULT HAVE THESE MADE IT FOR YOU TO DO YOUR WORK, TAKE CARE OF THINGS AT HOME, OR GET ALONG WITH OTHER PEOPLE?: SOMEWHAT DIFFICULT
5. BEING SO RESTLESS THAT IT IS HARD TO SIT STILL: NOT AT ALL
4. TROUBLE RELAXING: SEVERAL DAYS
6. BECOMING EASILY ANNOYED OR IRRITABLE: SEVERAL DAYS
GAD7 TOTAL SCORE: 6
IF YOU CHECKED OFF ANY PROBLEMS ON THIS QUESTIONNAIRE, HOW DIFFICULT HAVE THESE PROBLEMS MADE IT FOR YOU TO DO YOUR WORK, TAKE CARE OF THINGS AT HOME, OR GET ALONG WITH OTHER PEOPLE: SOMEWHAT DIFFICULT

## 2024-04-24 ASSESSMENT — SOCIAL DETERMINANTS OF HEALTH (SDOH): HOW OFTEN DO YOU GET TOGETHER WITH FRIENDS OR RELATIVES?: TWICE A WEEK

## 2024-04-24 NOTE — NURSING NOTE
Prior to immunization administration, verified patients identity using patient s name and date of birth. Please see Immunization Activity for additional information.     Screening Questionnaire for Adult Immunization    Are you sick today?   No   Do you have allergies to medications, food, a vaccine component or latex?   No   Have you ever had a serious reaction after receiving a vaccination?   No   Do you have a long-term health problem with heart, lung, kidney, or metabolic disease (e.g., diabetes), asthma, a blood disorder, no spleen, complement component deficiency, a cochlear implant, or a spinal fluid leak?  Are you on long-term aspirin therapy?   No   Do you have cancer, leukemia, HIV/AIDS, or any other immune system problem?   No   Do you have a parent, brother, or sister with an immune system problem?   No   In the past 3 months, have you taken medications that affect  your immune system, such as prednisone, other steroids, or anticancer drugs; drugs for the treatment of rheumatoid arthritis, Crohn s disease, or psoriasis; or have you had radiation treatments?   No   Have you had a seizure, or a brain or other nervous system problem?   No   During the past year, have you received a transfusion of blood or blood    products, or been given immune (gamma) globulin or antiviral drug?   No   For women: Are you pregnant or is there a chance you could become       pregnant during the next month?   No   Have you received any vaccinations in the past 4 weeks?   No     Immunization questionnaire answers were all negative.      Patient instructed to remain in clinic for 15 minutes afterwards, and to report any adverse reactions.     Screening performed by Thuy Nichols MA on 4/24/2024 at 2:35 PM.

## 2024-04-24 NOTE — PROGRESS NOTES
"Preventive Care Visit  [facility]  Shila Loza MD, Family Medicine  Apr 24, 2024      Assessment & Plan     Routine general medical examination at a health care facility  Need for vaccination  - ZOSTER RECOMBINANT ADJUVANTED (SHINGRIX)    Hypercholesterolemia  Chronic, slightly elevated LDL last year but will recheck today. Continue simvastatin 40 mg daily.   - Comprehensive metabolic panel; Future  - Lipid Panel; Future  - simvastatin (ZOCOR) 40 MG tablet; Take 1 tablet (40 mg) by mouth at bedtime    Recurrent major depressive disorder, in partial remission (H24)  Chronic, stable off medications and with therapy.     Elevated BP without diagnosis of hypertension  BP elevated x2 checks today and in 3/2023 (in the ED). Recommend obtaining a home BP cuff and starting to check BP at home BID for the next 1-2 weeks, send a [employer] message with home BP readings. If elevated out of office, will recommend office visit to start treatment for HTN. Lifestyle management discussed. Will obtain baseline renal function today -- elevated in 3/2023 in the ED and has not been checked again since.       BMI  Estimated body mass index is 34.33 kg/m  as calculated from the following:    Height as of this encounter: 1.736 m (5' 8.35\").    Weight as of this encounter: 103.5 kg (228 lb 1.3 oz).   Weight management plan: Discussed healthy diet and exercise guidelines    Counseling  Appropriate preventive services were discussed with this patient, including applicable screening as appropriate for fall prevention, nutrition, physical activity, Tobacco-use cessation, weight loss and cognition.  Checklist reviewing preventive services available has been given to the patient.  Reviewed patient's diet, addressing concerns and/or questions.   The patient's PHQ-9 score is consistent with mild depression. He was provided with information regarding depression.     Return in about 53 weeks (around 4/30/2025) for Annual Wellness " Visit.    Subjective   Kun is a 54 year old, presenting for the following:  Physical         HPI  Hyperlipidemia. Long-standing diagnosis.   - Medications: Has been treating with simvastatin 40 mg daily. No adverse medication effects. Denies myalgias.   - Walks about 2 miles/day.   - Most recent surveillance labs reviewed today and are notable for  (1/30/2023).   - CT calcium score from 8/28/2020 showed 91st percentile.   - The 10-year ASCVD risk score (Charly ODONNELL, et al., 2019) is: 5.8%    Values used to calculate the score:      Age: 54 years      Sex: Male      Is Non- : No      Diabetic: No      Tobacco smoker: No      Systolic Blood Pressure: 148 mmHg      Is BP treated: No      HDL Cholesterol: 46 mg/dL      Total Cholesterol: 198 mg/dL    Elevated BP without diagnosis of HTN.   - Doesn't check his BP at home.   - Most recent surveillance labs reviewed today and are notable for creatinin 1.46 (3/19/2023).   BP Readings from Last 6 Encounters:   04/24/24 (!) 148/86   03/19/23 (!) 148/105   01/30/23 132/88   10/28/21 114/80   10/22/21 130/89   08/17/20 135/83     Depression and OCD. Long-standing diagnosis.   - Medications: Not currently treating with medications, has had multiple medication trials (Prozac, Zoloft, Wellbutrin, Paxil, Celexa, Effexor).   - Counseling: Patient is currently seeing a therapist.   - Denies current suicidal ideation/self-harm.         4/24/2024   General Health   How would you rate your overall physical health? Good   Feel stress (tense, anxious, or unable to sleep) Rather much   (!) STRESS CONCERN      4/24/2024   Nutrition   Three or more servings of calcium each day? Yes   Diet: Regular (no restrictions)   How many servings of fruit and vegetables per day? (!) 2-3   How many sweetened beverages each day? 0-1         4/24/2024   Exercise   Days per week of moderate/strenous exercise 7 days   Average minutes spent exercising at this level 60 min          2024   Social Factors   Frequency of gathering with friends or relatives Twice a week   Worry food won't last until get money to buy more No   Food not last or not have enough money for food? No   Do you have housing?  Yes   Are you worried about losing your housing? No   Lack of transportation? No   Unable to get utilities (heat,electricity)? No         2024   Fall Risk   Fallen 2 or more times in the past year? No   Trouble with walking or balance? No          2024   Dental   Dentist two times every year? Yes         2024   TB Screening   Were you born outside of the [place]? No       Today's PHQ-9 Score:       2024    10:39 AM   PHQ-9 SCORE   PHQ-9 Total Score MyChart 6 (Mild depression)   PHQ-9 Total Score 6         2024   Substance Use   Alcohol more than 3/day or more than 7/wk No   Do you use any other substances recreationally? (!) ALCOHOL     Social History     Tobacco Use    Smoking status: Former     Current packs/day: 0.00     Types: Cigarettes     Start date: 1991     Quit date: 2003     Years since quittin.4    Smokeless tobacco: Never   Vaping Use    Vaping status: Never Used   Substance Use Topics    Alcohol use: Yes     Alcohol/week: 4.0 standard drinks of alcohol     Types: 4 Cans of beer per week     Comment: Approximately 4-7 drinks weekly    Drug use: No         2024   STI Screening   New sexual partner(s) since last STI/HIV test? No     ASCVD Risk   The 10-year ASCVD risk score (Charly ODONNELL, et al., 2019) is: 5.8%    Values used to calculate the score:      Age: 54 years      Sex: Male      Is Non- : No      Diabetic: No      Tobacco smoker: No      Systolic Blood Pressure: 148 mmHg      Is BP treated: No      HDL Cholesterol: 46 mg/dL      Total Cholesterol: 198 mg/dL    Reviewed and updated as needed this visit by Provider      Problems  Med Hx  Surg Hx  Fam Hx          He and his wife are stand-up  "nikia.      Objective    Exam  Vitals:    04/24/24 1407 04/24/24 1419   BP: (!) 143/87 (!) 148/86   Pulse: 60    Temp: 97.6  F (36.4  C)    SpO2: 93%    Weight: 103.5 kg (228 lb 1.3 oz)    Height: 1.736 m (5' 8.35\")      Estimated body mass index is 34.33 kg/m  as calculated from the following:    Height as of this encounter: 1.736 m (5' 8.35\").    Weight as of this encounter: 103.5 kg (228 lb 1.3 oz).    Physical Exam  GENERAL: alert and no distress  EYES: Eyes grossly normal to inspection, PERRL and conjunctivae and sclerae normal  HENT: ear canals and TM's normal, nose and mouth without ulcers or lesions  NECK: no adenopathy, no asymmetry, masses, or scars  RESP: lungs clear to auscultation - no rales, rhonchi or wheezes  CV: regular rate and rhythm, normal S1 S2, no S3 or S4, no murmur, click or rub, no peripheral edema  ABDOMEN: soft, nontender, without hepatosplenomegaly or masses  MS: no gross musculoskeletal defects noted, no edema, nodule on left third DIP joint  SKIN: no suspicious lesions or rashes  NEURO: Normal strength and tone, mentation intact and speech normal  PSYCH: mentation appears normal, affect normal/bright    Answers submitted by the patient for this visit:  Patient Health Questionnaire (Submitted on 4/24/2024)  If you checked off any problems, how difficult have these problems made it for you to do your work, take care of things at home, or get along with other people?: Somewhat difficult  PHQ9 TOTAL SCORE: 6  IDALIA-7 (Submitted on 4/24/2024)  IDALIA 7 TOTAL SCORE: 6      Signed Electronically by: Shila Loza MD  "

## 2024-04-24 NOTE — PATIENT INSTRUCTIONS
Preventive Care Advice   This is general advice given by our system to help you stay healthy. However, your care team may have specific advice just for you. Please talk to your care team about your preventive care needs.  Nutrition  Eat 5 or more servings of fruits and vegetables each day.  Try wheat bread, brown rice and whole grain pasta (instead of white bread, rice, and pasta).  Get enough calcium and vitamin D. Check the label on foods and aim for 100% of the RDA (recommended daily allowance).  Lifestyle  Exercise at least 150 minutes each week   (30 minutes a day, 5 days a week).  Do muscle strengthening activities 2 days a week. These help control your weight and prevent disease.  No smoking.  Wear sunscreen to prevent skin cancer.  Have a dental exam and cleaning every 6 months.  Yearly exams  See your health care team every year to talk about:  Any changes in your health.  Any medicines your care team has prescribed.  Preventive care, family planning, and ways to prevent chronic diseases.  Shots (vaccines)   HPV shots (up to age 26), if you've never had them before.  Hepatitis B shots (up to age 59), if you've never had them before.  COVID-19 shot: Get this shot when it's due.  Flu shot: Get a flu shot every year.  Tetanus shot: Get a tetanus shot every 10 years.  Pneumococcal, hepatitis A, and RSV shots: Ask your care team if you need these based on your risk.  Shingles shot (for age 50 and up).  General health tests  Diabetes screening:  Starting at age 35, Get screened for diabetes at least every 3 years.  If you are younger than age 35, ask your care team if you should be screened for diabetes.  Cholesterol test: At age 39, start having a cholesterol test every 5 years, or more often if advised.  Bone density scan (DEXA): At age 50, ask your care team if you should have this scan for osteoporosis (brittle bones).  Hepatitis C: Get tested at least once in your life.  STIs (sexually transmitted  infections)  Before age 24: Ask your care team if you should be screened for STIs.  After age 24: Get screened for STIs if you're at risk. You are at risk for STIs (including HIV) if:  You are sexually active with more than one person.  You don't use condoms every time.  You or a partner was diagnosed with a sexually transmitted infection.  If you are at risk for HIV, ask about PrEP medicine to prevent HIV.  Get tested for HIV at least once in your life, whether you are at risk for HIV or not.  Cancer screening tests  Cervical cancer screening: If you have a cervix, begin getting regular cervical cancer screening tests at age 21. Most people who have regular screenings with normal results can stop after age 65. Talk about this with your provider.  Breast cancer scan (mammogram): If you've ever had breasts, begin having regular mammograms starting at age 40. This is a scan to check for breast cancer.  Colon cancer screening: It is important to start screening for colon cancer at age 45.  Have a colonoscopy test every 10 years (or more often if you're at risk) Or, ask your provider about stool tests like a FIT test every year or Cologuard test every 3 years.  To learn more about your testing options, visit: [email or web address].  For help making a decision, visit: https://bit.ly/kx75409.  Prostate cancer screening test: If you have a prostate and are age 55 to 69, ask your provider if you would benefit from a yearly prostate cancer screening test.  Lung cancer screening: If you are a current or former smoker age 50 to 80, ask your care team if ongoing lung cancer screenings are right for you.  For informational purposes only. Not to replace the advice of your health care provider. Copyright   2023 [employer]. All rights reserved. Clinically reviewed by the [facility]. Stamp.it [id number] - REV 01/24.    Learning About Stress  What is stress?     Stress is your  body's response to a hard situation. Your body can have a physical, emotional, or mental response. Stress is a fact of life for most people, and it affects everyone differently. What causes stress for you may not be stressful for someone else.  A lot of things can cause stress. You may feel stress when you go on a job interview, take a test, or run a race. This kind of short-term stress is normal and even useful. It can help you if you need to work hard or react quickly. For example, stress can help you finish an important job on time.  Long-term stress is caused by ongoing stressful situations or events. Examples of long-term stress include long-term health problems, ongoing problems at work, or conflicts in your family. Long-term stress can harm your health.  How does stress affect your health?  When you are stressed, your body responds as though you are in danger. It makes hormones that speed up your heart, make you breathe faster, and give you a burst of energy. This is called the fight-or-flight stress response. If the stress is over quickly, your body goes back to normal and no harm is done.  But if stress happens too often or lasts too long, it can have bad effects. Long-term stress can make you more likely to get sick, and it can make symptoms of some diseases worse. If you tense up when you are stressed, you may develop neck, shoulder, or low back pain. Stress is linked to high blood pressure and heart disease.  Stress also harms your emotional health. It can make you gutierrez, tense, or depressed. Your relationships may suffer, and you may not do well at work or school.  What can you do to manage stress?  You can try these things to help manage stress:   Do something active. Exercise or activity can help reduce stress. Walking is a great way to get started. Even everyday activities such as housecleaning or yard work can help.  Try yoga or natalya chi. These techniques combine exercise and meditation. You may need  some training at first to learn them.  Do something you enjoy. For example, listen to music or go to a movie. Practice your hobby or do volunteer work.  Meditate. This can help you relax, because you are not worrying about what happened before or what may happen in the future.  Do guided imagery. Imagine yourself in any setting that helps you feel calm. You can use online videos, books, or a teacher to guide you.  Do breathing exercises. For example:  From a standing position, bend forward from the waist with your knees slightly bent. Let your arms dangle close to the floor.  Breathe in slowly and deeply as you return to a standing position. Roll up slowly and lift your head last.  Hold your breath for just a few seconds in the standing position.  Breathe out slowly and bend forward from the waist.  Let your feelings out. Talk, laugh, cry, and express anger when you need to. Talking with supportive friends or family, a counselor, or a juan leader about your feelings is a healthy way to relieve stress. Avoid discussing your feelings with people who make you feel worse.  Write. It may help to write about things that are bothering you. This helps you find out how much stress you feel and what is causing it. When you know this, you can find better ways to cope.  What can you do to prevent stress?  You might try some of these things to help prevent stress:  Manage your time. This helps you find time to do the things you want and need to do.  Get enough sleep. Your body recovers from the stresses of the day while you are sleeping.  Get support. Your family, friends, and community can make a difference in how you experience stress.  Limit your news feed. Avoid or limit time on social media or news that may make you feel stressed.  Do something active. Exercise or activity can help reduce stress. Walking is a great way to get started.  Where can you learn more?  Go to https://www.healthwise.net/patiented  Enter N032 in the  "search box to learn more about \"Learning About Stress.\"  Current as of: October 24, 2023               Content Version: 14.0    0794-6016 [employer].   Care instructions adapted under license by your healthcare professional. If you have questions about a medical condition or this instruction, always ask your healthcare professional. [employer] disclaims any warranty or liability for your use of this information.      Learning About Depression Screening  What is depression screening?  Depression screening is a way to see if you have depression symptoms. It may be done by a doctor or counselor. It's often part of a routine checkup. That's because your mental health is just as important as your physical health.  Depression is a mental health condition that affects how you feel, think, and act. You may:  Have less energy.  Lose interest in your daily activities.  Feel sad and grouchy for a long time.  Depression is very common. It affects people of all ages.  Many things can lead to depression. Some people become depressed after they have a stroke or find out they have a major illness like cancer or heart disease. The death of a loved one or a breakup may lead to depression. It can run in families. Most experts believe that a combination of inherited genes and stressful life events can cause it.  What happens during screening?  You may be asked to fill out a form about your depression symptoms. You and the doctor will discuss your answers. The doctor may ask you more questions to learn more about how you think, act, and feel.  What happens after screening?  If you have symptoms of depression, your doctor will talk to you about your options.  Doctors usually treat depression with medicines or counseling. Often, combining the two works best. Many people don't get help because they think that they'll get over the depression on their own. But people with depression may not get better unless they " "get treatment.  The cause of depression is not well understood. There may be many factors involved. But if you have depression, it's not your fault.  A serious symptom of depression is thinking about death or suicide. If you or someone you care about talks about this or about feeling hopeless, get help right away.  It's important to know that depression can be treated. Medicine, counseling, and self-care may help.  Where can you learn more?  Go to https://www.[employer].net/patiented  Enter T185 in the search box to learn more about \"Learning About Depression Screening.\"  Current as of: June 24, 2023               Content Version: 14.0    6793-4110 [employer].   Care instructions adapted under license by your healthcare professional. If you have questions about a medical condition or this instruction, always ask your healthcare professional. [employer] disclaims any warranty or liability for your use of this information.      Substance Use Disorder: Care Instructions  Overview     You can improve your life and health by stopping your use of alcohol or drugs. When you don't drink or use drugs, you may feel and sleep better. You may get along better with your family, friends, and coworkers. There are medicines and programs that can help with substance use disorder.  How can you care for yourself at home?  Here are some ways to help you stay sober and prevent relapse.  If you have been given medicine to help keep you sober or reduce your cravings, be sure to take it exactly as prescribed.  Talk to your doctor about programs that can help you stop using drugs or drinking alcohol.  Do not keep alcohol or drugs in your home.  Plan ahead. Think about what you'll say if other people ask you to drink or use drugs. Try not to spend time with people who drink or use drugs.  Use the time and money spent on drinking or drugs to do something that's important to you.  Preventing a relapse  Have a plan " to deal with relapse. Learn to recognize changes in your thinking that lead you to drink or use drugs. Get help before you start to drink or use drugs again.  Try to stay away from situations, friends, or places that may lead you to drink or use drugs.  If you feel the need to drink alcohol or use drugs again, seek help right away. Call a trusted friend or family member. Some people get support from organizations such as Narcotics Anonymous or [employer] or from treatment facilities.  If you relapse, get help as soon as you can. Some people make a plan with another person that outlines what they want that person to do for them if they relapse. The plan usually includes how to handle the relapse and who to notify in case of relapse.  Don't give up. Remember that a relapse doesn't mean that you have failed. Use the experience to learn the triggers that lead you to drink or use drugs. Then quit again. Recovery is a lifelong process. Many people have several relapses before they are able to quit for good.  Follow-up care is a key part of your treatment and safety. Be sure to make and go to all appointments, and call your doctor if you are having problems. It's also a good idea to know your test results and keep a list of the medicines you take.  When should you call for help?   Call 911  anytime you think you may need emergency care. For example, call if you or someone else:    Has overdosed or has withdrawal signs. Be sure to tell the emergency workers that you are or someone else is using or trying to quit using drugs. Overdose or withdrawal signs may include:  Losing consciousness.  Seizure.  Seeing or hearing things that aren't there (hallucinations).     Is thinking or talking about suicide or harming others.   Where to get help 24 hours a day, 7 days a week   If you or someone you know talks about suicide, self-harm, a mental health crisis, a substance use crisis, or any other kind of emotional distress, get  "help right away. You can:    Call the Suicide and Crisis Lifeline at 988.     Call 1-080-027-LMCX (1-662.277.1090).     Text HOME to 582709 to access the Crisis Text Line.   Consider saving these numbers in your phone.  Go to [email or web address] for more information or to chat online.  Call your doctor now or seek immediate medical care if:    You are having withdrawal symptoms. These may include nausea or vomiting, sweating, shakiness, and anxiety.   Watch closely for changes in your health, and be sure to contact your doctor if:    You have a relapse.     You need more help or support to stop.   Where can you learn more?  Go to https://www.[employer].net/patiented  Enter H573 in the search box to learn more about \"Substance Use Disorder: Care Instructions.\"  Current as of: November 15, 2023               Content Version: 14.0    7433-5285 [employer], [employer].   Care instructions adapted under license by your healthcare professional. If you have questions about a medical condition or this instruction, always ask your healthcare professional. Health[employer], [employer] disclaims any warranty or liability for your use of this information.      "

## 2024-04-25 LAB
ALBUMIN SERPL BCG-MCNC: 4.7 G/DL (ref 3.5–5.2)
ALP SERPL-CCNC: 65 U/L (ref 40–150)
ALT SERPL W P-5'-P-CCNC: 37 U/L (ref 0–70)
ANION GAP SERPL CALCULATED.3IONS-SCNC: 12 MMOL/L (ref 7–15)
AST SERPL W P-5'-P-CCNC: 29 U/L (ref 0–45)
BILIRUB SERPL-MCNC: 0.4 MG/DL
BUN SERPL-MCNC: 12.3 MG/DL (ref 6–20)
CALCIUM SERPL-MCNC: 9.4 MG/DL (ref 8.6–10)
CHLORIDE SERPL-SCNC: 104 MMOL/L (ref 98–107)
CHOLEST SERPL-MCNC: 190 MG/DL
CREAT SERPL-MCNC: 1.11 MG/DL (ref 0.67–1.17)
DEPRECATED HCO3 PLAS-SCNC: 23 MMOL/L (ref 22–29)
EGFRCR SERPLBLD CKD-EPI 2021: 80 ML/MIN/1.73M2
FASTING STATUS PATIENT QL REPORTED: NO
GLUCOSE SERPL-MCNC: 88 MG/DL (ref 70–99)
HDLC SERPL-MCNC: 39 MG/DL
LDLC SERPL CALC-MCNC: 102 MG/DL
NONHDLC SERPL-MCNC: 151 MG/DL
POTASSIUM SERPL-SCNC: 4.3 MMOL/L (ref 3.4–5.3)
PROT SERPL-MCNC: 7.6 G/DL (ref 6.4–8.3)
SODIUM SERPL-SCNC: 139 MMOL/L (ref 135–145)
TRIGL SERPL-MCNC: 246 MG/DL

## 2024-06-18 ENCOUNTER — MYC MEDICAL ADVICE (OUTPATIENT)
Dept: FAMILY MEDICINE | Facility: CLINIC | Age: 54
End: 2024-06-18

## 2025-02-03 ENCOUNTER — ALLIED HEALTH/NURSE VISIT (OUTPATIENT)
Dept: FAMILY MEDICINE | Facility: CLINIC | Age: 55
End: 2025-02-03
Payer: COMMERCIAL

## 2025-02-03 VITALS — TEMPERATURE: 98 F

## 2025-02-03 DIAGNOSIS — Z23 ENCOUNTER FOR IMMUNIZATION: Primary | ICD-10-CM

## 2025-02-03 NOTE — PROGRESS NOTES
Prior to immunization administration, verified patients identity using patient s name and date of birth. Please see Immunization Activity for additional information.     Is the patient's temperature normal (100.5 or less)? Yes              2/3/2025   COVID   Have you had myocarditis or pericarditis (inflammation of or around the heart muscle) after getting a COVID-19 vaccine? No   Have you had a serious reaction to a COVID vaccine or something in a COVID vaccine, like polyethylene glycol (PEG) or polysorbate? No   Have you had multisystem inflammatory syndrome from COVID-19 in the past 90 days? No   Have you received a bone marrow transplant within the previous 3 months? No         Patient MEETS CRITERIA. PROCEED with vaccine administration.            2/3/2025   INFLUENZA   Would you like to receive the flu shot or the nasal flu vaccine today? Flu Shot   Have you had a serious reaction to a flu vaccine or something in a flu vaccine? No   Have you had Guillain-Lowland syndrome within 6 weeks of getting a vaccine? No   Have you received a bone marrow transplant within the previous 6 months? No         Patient MEETS CRITERIA. PROCEED with vaccine administration.        Patient instructed to remain in clinic for 15 minutes afterwards, and to report any adverse reactions.      Link to Ancillary Visit Immunization Standing Orders SmartSet     Screening performed by Malia Alfaro LPN on 2/3/2025 at 2:56 PM.